# Patient Record
Sex: MALE | Race: BLACK OR AFRICAN AMERICAN | Employment: FULL TIME
[De-identification: names, ages, dates, MRNs, and addresses within clinical notes are randomized per-mention and may not be internally consistent; named-entity substitution may affect disease eponyms.]

---

## 2017-02-07 PROBLEM — E87.6 HYPOKALEMIA: Status: ACTIVE | Noted: 2017-02-07

## 2017-04-24 ENCOUNTER — HOSPITAL ENCOUNTER (OUTPATIENT)
Dept: GENERAL RADIOLOGY | Facility: CLINIC | Age: 52
Discharge: HOME | End: 2017-04-24
Payer: COMMERCIAL

## 2017-04-24 DIAGNOSIS — G89.29 CHRONIC PAIN OF BOTH KNEES: ICD-10-CM

## 2017-04-24 DIAGNOSIS — M25.562 CHRONIC PAIN OF BOTH KNEES: ICD-10-CM

## 2017-04-24 DIAGNOSIS — M25.561 CHRONIC PAIN OF BOTH KNEES: ICD-10-CM

## 2017-04-24 PROCEDURE — 73562 X-RAY EXAM OF KNEE 3: CPT

## 2017-06-19 ENCOUNTER — HOSPITAL ENCOUNTER (OUTPATIENT)
Age: 52
Setting detail: SPECIMEN
Discharge: HOME | End: 2017-06-19
Payer: COMMERCIAL

## 2017-06-19 LAB
ABSOLUTE EOS #: 0.2 K/UL (ref 0–0.4)
ABSOLUTE LYMPH #: 3.2 K/UL (ref 1–4.8)
ABSOLUTE MONO #: 0.6 K/UL (ref 0.1–1.2)
ALT SERPL-CCNC: 41 U/L (ref 5–41)
ANION GAP SERPL CALCULATED.3IONS-SCNC: 16 MMOL/L (ref 9–17)
AST SERPL-CCNC: 28 U/L
BASOPHILS # BLD: 1 %
BASOPHILS ABSOLUTE: 0 K/UL (ref 0–0.2)
BUN BLDV-MCNC: 25 MG/DL (ref 6–20)
BUN/CREAT BLD: ABNORMAL (ref 9–20)
CALCIUM SERPL-MCNC: 9.7 MG/DL (ref 8.6–10.4)
CHLORIDE BLD-SCNC: 107 MMOL/L (ref 98–107)
CO2: 19 MMOL/L (ref 20–31)
CREAT SERPL-MCNC: 1.94 MG/DL (ref 0.7–1.2)
DIFFERENTIAL TYPE: ABNORMAL
EOSINOPHILS RELATIVE PERCENT: 3 %
ESTIMATED AVERAGE GLUCOSE: 108 MG/DL
GFR AFRICAN AMERICAN: 44 ML/MIN
GFR NON-AFRICAN AMERICAN: 36 ML/MIN
GFR SERPL CREATININE-BSD FRML MDRD: ABNORMAL ML/MIN/{1.73_M2}
GFR SERPL CREATININE-BSD FRML MDRD: ABNORMAL ML/MIN/{1.73_M2}
GLUCOSE BLD-MCNC: 80 MG/DL (ref 70–99)
HBA1C MFR BLD: 5.4 % (ref 4–6)
HCT VFR BLD CALC: 36.8 % (ref 41–53)
HEMOGLOBIN: 12.2 G/DL (ref 13.5–17.5)
INSULIN COMMENT: NORMAL
INSULIN REFERENCE RANGE:: NORMAL
INSULIN: 67.7 MU/L
LYMPHOCYTES # BLD: 44 %
MCH RBC QN AUTO: 30.7 PG (ref 26–34)
MCHC RBC AUTO-ENTMCNC: 33.3 G/DL (ref 31–37)
MCV RBC AUTO: 92.3 FL (ref 80–100)
MONOCYTES # BLD: 9 %
PDW BLD-RTO: 14.8 % (ref 12.5–15.4)
PLATELET # BLD: 250 K/UL (ref 140–450)
PLATELET ESTIMATE: ABNORMAL
PMV BLD AUTO: 8.9 FL (ref 6–12)
POTASSIUM SERPL-SCNC: 4.5 MMOL/L (ref 3.7–5.3)
RBC # BLD: 3.98 M/UL (ref 4.5–5.9)
RBC # BLD: ABNORMAL 10*6/UL
SEG NEUTROPHILS: 43 %
SEGMENTED NEUTROPHILS ABSOLUTE COUNT: 3.1 K/UL (ref 1.8–7.7)
SEX HORMONE BINDING GLOBULIN: 31 NMOL/L (ref 11–80)
SODIUM BLD-SCNC: 142 MMOL/L (ref 135–144)
T3 FREE: 3.12 PG/ML (ref 2.02–4.43)
TESTOSTERONE FREE-NONMALE: 61 PG/ML (ref 47–244)
TESTOSTERONE TOTAL: 299 NG/DL (ref 220–1000)
THYROXINE, FREE: 0.87 NG/DL (ref 0.93–1.7)
TSH SERPL DL<=0.05 MIU/L-ACNC: 0.78 MIU/L (ref 0.3–5)
WBC # BLD: 7.2 K/UL (ref 3.5–11)
WBC # BLD: ABNORMAL 10*3/UL

## 2017-07-10 PROBLEM — S39.012A LUMBAR STRAIN: Status: ACTIVE | Noted: 2017-07-10

## 2017-07-18 PROBLEM — J44.9 COPD (CHRONIC OBSTRUCTIVE PULMONARY DISEASE) (HCC): Status: ACTIVE | Noted: 2017-07-18

## 2017-10-09 ENCOUNTER — OFFICE VISIT (OUTPATIENT)
Dept: PULMONOLOGY | Age: 52
End: 2017-10-09
Payer: COMMERCIAL

## 2017-10-09 VITALS
BODY MASS INDEX: 37.77 KG/M2 | SYSTOLIC BLOOD PRESSURE: 126 MMHG | DIASTOLIC BLOOD PRESSURE: 79 MMHG | HEART RATE: 80 BPM | TEMPERATURE: 98.8 F | OXYGEN SATURATION: 98 % | HEIGHT: 69 IN | WEIGHT: 255 LBS | RESPIRATION RATE: 14 BRPM

## 2017-10-09 DIAGNOSIS — J32.9 CHRONIC SINUSITIS, UNSPECIFIED LOCATION: ICD-10-CM

## 2017-10-09 DIAGNOSIS — J30.89 NON-SEASONAL ALLERGIC RHINITIS, UNSPECIFIED ALLERGIC RHINITIS TRIGGER: ICD-10-CM

## 2017-10-09 DIAGNOSIS — G47.33 OSA ON CPAP: ICD-10-CM

## 2017-10-09 DIAGNOSIS — Z99.89 OSA ON CPAP: ICD-10-CM

## 2017-10-09 DIAGNOSIS — R05.3 CHRONIC COUGH: Primary | ICD-10-CM

## 2017-10-09 PROCEDURE — 1036F TOBACCO NON-USER: CPT | Performed by: INTERNAL MEDICINE

## 2017-10-09 PROCEDURE — G8417 CALC BMI ABV UP PARAM F/U: HCPCS | Performed by: INTERNAL MEDICINE

## 2017-10-09 PROCEDURE — G8428 CUR MEDS NOT DOCUMENT: HCPCS | Performed by: INTERNAL MEDICINE

## 2017-10-09 PROCEDURE — 3017F COLORECTAL CA SCREEN DOC REV: CPT | Performed by: INTERNAL MEDICINE

## 2017-10-09 PROCEDURE — 99204 OFFICE O/P NEW MOD 45 MIN: CPT | Performed by: INTERNAL MEDICINE

## 2017-10-09 PROCEDURE — G8484 FLU IMMUNIZE NO ADMIN: HCPCS | Performed by: INTERNAL MEDICINE

## 2017-10-09 RX ORDER — FLUTICASONE PROPIONATE 50 MCG
2 SPRAY, SUSPENSION (ML) NASAL DAILY
Qty: 1 BOTTLE | Refills: 5 | Status: SHIPPED | OUTPATIENT
Start: 2017-10-09 | End: 2018-07-16 | Stop reason: ALTCHOICE

## 2017-10-09 RX ORDER — FLUTICASONE PROPIONATE 50 MCG
1 SPRAY, SUSPENSION (ML) NASAL DAILY
Qty: 1 BOTTLE | Refills: 3 | Status: SHIPPED | OUTPATIENT
Start: 2017-10-09 | End: 2017-10-09 | Stop reason: DRUGHIGH

## 2017-10-09 NOTE — PROGRESS NOTES
OUTPATIENT PULMONARY CONSULT NOTE      Patient:  Charli Dutta  MRN: [id number]    Consulting Physician: DR Charlotte Canchola  Reason for Consult: Chronic Cough  Primacy Care Physician: Oscar Beatty MD    HISTORY OF PRESENT ILLNESS:   The patient is a 45 y.o. male referred here for evaluation of cough  Positive cough for few years had cough before but worse for 2-3 years, cough is dry and also at night   No SOB, no wheezing, and chest tightness and able to do all activities and exercise and weight lifting and goes to gym  No orthopnea or PND  Enoc GERD symptoms, no heartburn and tried on PPI but did not help and stopped taking it    Per patient he had work up done and referred by Dr Charlotte Canchola to another doctor  including some kind of scope but does not remember where was it done , who did the scope and what kind of the doctor whether pulmonologist or gastroenterologist    Had PFTs done and showed ?  Obstruction and given symbicort 3 months ago and seems to help but he took for a month and stopped it  Denies use of ACEI and started on aldactone recently but cough before that and no new medicines  Has POLI on cpap and very compliant with cpap, cough also at night, followed by Dr Ron Roto    Does have allergy problems but worse for last 6 months ago and had sinus surgery/ cleaning done by done Dr Winda Galeazzi few years ago after which he felt better and now for last 6 months more problems again with nasal congestion, post nasal dripping    Denies occupational exposure, denies allergies or mold exposure, food or environmental allergies, denies smoking exposure, no animal exposure  Exsmoker for 1/2 ppd for 35 years and still smoke cigar off and on and it cause more cough and irritation     Past Medical History:        Diagnosis Date    Arthritis     Bronchitis     Caffeine use     low    Calculus of kidney     Cancer ([facility] [address]     Chronic kidney disease     Erectile dysfunction     GERD (gastroesophageal reflux disease)     tinnitus, ear drainage, earaches, snoring, sore mouth, sore throat, hoarseness and voice change  RESPIRATORY:  positive for  dry cough  negative for  cough with sputum, dyspnea, wheezing, hemoptysis, chest pain, pleuritic pain and cyanosis  CARDIOVASCULAR:  negative for  chest pain, dyspnea, palpitations, orthopnea, PND, exertional chest pressure/discomfort, fatigue, early saiety, edema, syncope  GASTROINTESTINAL:  negative for vomiting, change in bowel habits, diarrhea, constipation, abdominal pain, pruritus, abdominal mass, abdominal distention, jaundice, dysphagia, reflux, regurgitation, odynophagia, hematemesis and hemtochezia  GENITOURINARY:  negative for frequency, dysuria, nocturia, urinary incontinence, hesitancy, decreased stream and hematuria  HEMATOLOGIC/LYMPHATIC:  negative for easy bruising, bleeding, lymphadenopathy and petechiae  ALLERGIC/IMMUNOLOGIC:  negative for recurrent infections, urticaria, hay fever, angioedema and anaphylaxis  ENDOCRINE:  negative for heat intolerance, cold intolerance, tremor, weight changes and change in bowel habits  MUSCULOSKELETAL:  negative for  myalgias, arthralgias, pain, joint swelling, stiff joints, decreased range of motion and muscle weakness  NEUROLOGICAL:  negative for headaches, dizziness, seizures, memory problems, speech problems, visual disturbance, coordination problems, gait problems, tremor, dysphagia, weakness, numbness, syncope, near syncope and tingling  BEHAVIOR/PSYCH:  negative for poor appetite, increased appetite, decreased sleep, increased sleep, decreased energy level, increased energy level, poor concentration, depressed mood, elated mood, fatigue, agitated and increased agitation          Physical Exam:    Vitals: /79   Pulse 80   Temp 98.8 °F (37.1 °C)   Resp 14   Ht 5' 9\" (1.753 m)   Wt 255 lb (115.7 kg)   SpO2 98% Comment: ROOM AIR  BMI 37.66 kg/m²   Last 3 weights:    Wt Readings from Last 3 Encounters:   10/09/17 255 lb (115.7 kg)   09/11/17 253 lb (114.8 kg)   08/07/17 256 lb (116.1 kg)     Body mass index is 37.66 kg/m².     Physical Examination:   General appearance - alert, well appearing, and in no distress  Mental status - alert, oriented to person, place, and time  Eyes - pupils equal and reactive, extraocular eye movements intact, sclera anicteric  Ears - right ear normal, left ear normal  Nose - mucosal congestion, mucosal pallor and clear rhinorrhea  Mouth - mucous membranes moist, pharynx normal without lesions  Neck - supple, no significant adenopathy, short and thick  Chest - clear to auscultation, no wheezes, rales or rhonchi, symmetric air entry  Heart - normal rate, regular rhythm, normal S1, S2, no murmurs, rubs, clicks or gallops  Abdomen - soft, nontender, nondistended, no masses or organomegaly  Neurological - alert, oriented, normal speech, no focal findings or movement disorder noted}  Extremities - peripheral pulses normal, no pedal edema, no clubbing or cyanosis  Skin - normal coloration and turgor, no rashes, no suspicious skin lesions noted       LABS:    CBC:   WBC   Date Value Ref Range Status   06/19/2017 7.2 3.5 - 11.0 k/uL Final   01/03/2017 5.9 3.5 - 11.0 k/uL Final   02/26/2016 5.0 3.5 - 11.0 k/uL Final     Hemoglobin   Date Value Ref Range Status   06/19/2017 12.2 (L) 13.5 - 17.5 g/dL Final   01/03/2017 12.1 (L) 13.5 - 17.5 g/dL Final   02/26/2016 11.8 (L) 13.5 - 17.5 g/dL Final     Platelets   Date Value Ref Range Status   06/19/2017 250 140 - 450 k/uL Final   01/03/2017 261 140 - 450 k/uL Final   02/26/2016 213 140 - 450 k/uL Final     BMP:   Sodium   Date Value Ref Range Status   08/01/2017 136 mmol/L Final   06/19/2017 142 135 - 144 mmol/L Final   04/10/2017 142 mmol/L Final     Potassium   Date Value Ref Range Status   08/01/2017 4.2 mmol/L Final   06/19/2017 4.5 3.7 - 5.3 mmol/L Final   04/10/2017 3.1 mmol/L Final     Chloride   Date Value Ref Range Status   08/01/2017 105 mmol/L Final   06/19/2017 107 98 - 107 mmol/L Final   04/10/2017 104 mmol/L Final     CO2   Date Value Ref Range Status   08/01/2017 20 mmol/L Final   06/19/2017 19 (L) 20 - 31 mmol/L Final   04/10/2017 29 mmol/L Final     BUN   Date Value Ref Range Status   08/01/2017 29 mg/dL Final   06/19/2017 25 (H) 6 - 20 mg/dL Final   04/10/2017 17 mg/dL Final     CREATININE   Date Value Ref Range Status   08/01/2017 2.16  Final   06/19/2017 1.94 (H) 0.70 - 1.20 mg/dL Final   04/10/2017 1.60  Final     Glucose   Date Value Ref Range Status   08/01/2017 83 mg/dL Final   06/19/2017 80 70 - 99 mg/dL Final   04/10/2017 97 mg/dL Final     Hepatic:   AST   Date Value Ref Range Status   06/19/2017 28 <40 U/L Final     Comment:     [address], [address] [place] [address] [phone]   09/20/2016 23 <40 U/L Final     Comment:     [address], [address] [place] [address] [phone]   02/26/2016 25 <40 U/L Final     ALT   Date Value Ref Range Status   06/19/2017 41 5 - 41 U/L Final     Comment:     [address], [address] [place] [address] [phone]   09/20/2016 57 (H) 5 - 41 U/L Final     Comment:     [address], [address] [place] [address] [phone]   02/26/2016 32 5 - 41 U/L Final     Total Bilirubin   Date Value Ref Range Status   02/26/2016 0.50 0.3 - 1.2 mg/dL Final   12/10/2015 0.78 0.3 - 1.2 mg/dL Final   02/27/2015 0.56 0.3 - 1.2 mg/dL Final     Alkaline Phosphatase   Date Value Ref Range Status   02/26/2016 75 40 - 129 U/L Final   12/10/2015 70 40 - 129 U/L Final   02/27/2015 76 40 - 129 U/L Final     Amylase: No results found for: AMYLASE  Lipase: No results found for: LIPASE  CARDIAC ENZYMES: No results found for: CKTOTAL, CKMB, CKMBINDEX, TROPONINI  BNP: No results found for: BNP  Lipids:   Cholesterol, Total   Date Value Ref Range Status   10/03/2016 188 mg/dL Final     HDL   Date Value Ref Range Status   10/03/2016 35 35 - 70 mg/dL Final       INR: No results found for: INR  Thyroid:   TSH Date Value Ref Range Status   06/19/2017 0.78 0.30 - 5.00 mIU/L Final     Comment:     [employer] [address] [facility] [employer] [phone]     Urinalysis:   Bacteria, UA   Date Value Ref Range Status   01/03/2017 NOT REPORTED NONE Final     Blood, UA POC   Date Value Ref Range Status   04/24/2017 negative  Final     Clarity, UA   Date Value Ref Range Status   04/24/2017 clear  Final     Color, UA   Date Value Ref Range Status   04/24/2017 yellow  Final   01/03/2017 YELLOW YEL Final     pH, UA   Date Value Ref Range Status   01/03/2017 6.0 5.0 - 8.0 Final     Protein, UA   Date Value Ref Range Status   01/03/2017 2+ (A) NEG Final     Protein, UA POC   Date Value Ref Range Status   04/24/2017 100 mg  Final     RBC, UA   Date Value Ref Range Status   01/03/2017 0 TO 2 0 - 2 /HPF Final     Specific Gravity, UA   Date Value Ref Range Status   01/03/2017 1.018 1.005 - 1.030 Final     Bilirubin, UA   Date Value Ref Range Status   01/03/2017 neg  Final     Bilirubin Urine   Date Value Ref Range Status   01/03/2017 NEGATIVE NEG Final     Nitrite, Urine   Date Value Ref Range Status   01/03/2017 NEGATIVE NEG Final     WBC, UA   Date Value Ref Range Status   01/03/2017 0 TO 2 0 - 5 /HPF Final     Leukocyte Esterase, Urine   Date Value Ref Range Status   01/03/2017 NEGATIVE NEG Final     Comment:     [employer] [address] [employer] [phone]     Leukocytes, UA   Date Value Ref Range Status   04/24/2017 negative  Final     Glucose, Ur   Date Value Ref Range Status   01/03/2017 NEGATIVE NEG Final     Glucose, UA POC   Date Value Ref Range Status   04/24/2017 negative  Final     Cultures:-  -----------------------------------------------------------------    ABGs: No results found for: PHART, PO2ART, FQS3WMS    Pulmonary Functions Testing Results:    No results found for: FEV1, FVC, AOL9MZM, TLC, DLCO    CXR  8/28/17 report from [facility]  Impression:  Stable chest, no acute cardiopulmonary abnormalities seen. CT Scans    EKG:   ECHO:     Assessment and Plan       ICD-10-CM ICD-9-CM    1. Chronic cough R05 786.2    2. Non-seasonal allergic rhinitis, unspecified allergic rhinitis trigger J30.89 477.8    3. POLI on CPAP G47.33 327.23     Z99.89 V46.8         HTN (hypertension)    Prostate cancer (HCC)    Impotence of organic origin    Microscopic hematuria    Smoker    Hypokalemia    COPD (chronic obstructive pulmonary disease) ([facility])       Plan and Recommendations: Will need records from Dr Lanny Chandra office regarding scopes done as patient does not remember  CT Scan Sinuses  PFTs   Methacholine challenge test   Will need trial of ICS after above  Will need to start Flonase nasal spray   Nasal irrigation  Avoid allergens and triggers   Claritin as needed  Complete smoking cessation discussed  Reflux precautions    RTC 4 weeks      CPAP at least 4 hrs qhs  Wt loss is recommended and discussed  Follow good sleep hygeine instructions  Use humidifier if needed  Questions answered pertaining to diagnosis and management explained importance of compliance with therapy   He is followed with Dr Cruz Prajapati for sleep apnea      It was my pleasure to evaluate Carlton Winchester today. Please call with questions.     Malick Shine MD             10/9/2017, 1:43 PM

## 2017-10-11 ENCOUNTER — TELEPHONE (OUTPATIENT)
Dept: PULMONOLOGY | Age: 52
End: 2017-10-11

## 2017-10-16 ENCOUNTER — HOSPITAL ENCOUNTER (OUTPATIENT)
Dept: CT IMAGING | Age: 52
Discharge: HOME | End: 2017-10-16
Payer: COMMERCIAL

## 2017-10-16 DIAGNOSIS — R05.3 CHRONIC COUGH: ICD-10-CM

## 2017-10-16 DIAGNOSIS — J32.9 CHRONIC SINUSITIS, UNSPECIFIED LOCATION: ICD-10-CM

## 2017-10-16 PROCEDURE — 70486 CT MAXILLOFACIAL W/O DYE: CPT

## 2017-10-30 ENCOUNTER — OFFICE VISIT (OUTPATIENT)
Dept: PULMONOLOGY | Age: 52
End: 2017-10-30
Payer: COMMERCIAL

## 2017-10-30 VITALS
HEIGHT: 69 IN | OXYGEN SATURATION: 99 % | TEMPERATURE: 97.1 F | HEART RATE: 85 BPM | BODY MASS INDEX: 37.62 KG/M2 | DIASTOLIC BLOOD PRESSURE: 81 MMHG | RESPIRATION RATE: 14 BRPM | SYSTOLIC BLOOD PRESSURE: 122 MMHG | WEIGHT: 254 LBS

## 2017-10-30 VITALS — WEIGHT: 254 LBS | HEART RATE: 84 BPM | HEIGHT: 69 IN | BODY MASS INDEX: 37.62 KG/M2 | OXYGEN SATURATION: 99 %

## 2017-10-30 DIAGNOSIS — G47.33 OSA ON CPAP: ICD-10-CM

## 2017-10-30 DIAGNOSIS — J41.0 SMOKERS' COUGH (HCC): ICD-10-CM

## 2017-10-30 DIAGNOSIS — F17.210 SMOKING GREATER THAN 20 PACK YEARS: ICD-10-CM

## 2017-10-30 DIAGNOSIS — R05.3 CHRONIC COUGH: Primary | ICD-10-CM

## 2017-10-30 DIAGNOSIS — Z99.89 OSA ON CPAP: ICD-10-CM

## 2017-10-30 DIAGNOSIS — J32.9 CHRONIC SINUSITIS, UNSPECIFIED LOCATION: ICD-10-CM

## 2017-10-30 PROCEDURE — G8484 FLU IMMUNIZE NO ADMIN: HCPCS | Performed by: INTERNAL MEDICINE

## 2017-10-30 PROCEDURE — 1036F TOBACCO NON-USER: CPT | Performed by: INTERNAL MEDICINE

## 2017-10-30 PROCEDURE — 3023F SPIROM DOC REV: CPT | Performed by: INTERNAL MEDICINE

## 2017-10-30 PROCEDURE — 94726 PLETHYSMOGRAPHY LUNG VOLUMES: CPT | Performed by: INTERNAL MEDICINE

## 2017-10-30 PROCEDURE — 94729 DIFFUSING CAPACITY: CPT | Performed by: INTERNAL MEDICINE

## 2017-10-30 PROCEDURE — G8427 DOCREV CUR MEDS BY ELIG CLIN: HCPCS | Performed by: INTERNAL MEDICINE

## 2017-10-30 PROCEDURE — 94375 RESPIRATORY FLOW VOLUME LOOP: CPT | Performed by: INTERNAL MEDICINE

## 2017-10-30 PROCEDURE — 3017F COLORECTAL CA SCREEN DOC REV: CPT | Performed by: INTERNAL MEDICINE

## 2017-10-30 PROCEDURE — 99213 OFFICE O/P EST LOW 20 MIN: CPT | Performed by: INTERNAL MEDICINE

## 2017-10-30 PROCEDURE — G8417 CALC BMI ABV UP PARAM F/U: HCPCS | Performed by: INTERNAL MEDICINE

## 2017-10-30 PROCEDURE — G8926 SPIRO NO PERF OR DOC: HCPCS | Performed by: INTERNAL MEDICINE

## 2017-10-30 ASSESSMENT — ENCOUNTER SYMPTOMS
EYES NEGATIVE: 1
COUGH: 1

## 2017-10-30 NOTE — PROGRESS NOTES
Subjective:      Patient ID: David Frazier is a 45 y.o. male. HPI  Follow-up visit for chronic cough. Last seen by Dr. Gurmeet Vazquez about a month ago. Reports severe, unremitting cough. Minimal sputum production. No hemoptysis. Had pulmonary function studies done today which show normal flows lung volumes and diffusing capacity. Flow volume loop normal as well. FVC 4.36 L (110%) FEV1 3.16 L (100%), FEV1/FVC 72%. Chest x-ray report from the [facility] dated August 28 was read as no acute cardiopulmonary disease. The patient continues to smoke about a half pack of cigarettes per day. He states that all attempts at quitting a been unsuccessful. He states that he quit for about a month. Noted significant decrease in his symptoms of cough. He restarted for unclear reasons. States that he's used Chantix, nicotine replacement therapy, and cold turkey all without lasting result. \"I don't know why I smoke. \"  Patient has a history of chronic sinusitis post submucous resection. Repeat CT scan of the sinuses is reviewed. Minimal mucosal thickening in both maxillary sinuses otherwise clear. Patient specifically denies symptoms of gastroesophageal reflux. Review of Systems   Constitutional: Negative. HENT: Negative. Eyes: Negative. Respiratory: Positive for cough. Cardiovascular: Negative. All other systems reviewed and are negative. Objective:     Physical Exam   Constitutional: He is oriented to person, place, and time. He appears well-developed and well-nourished. Coughing was noted throughout the entire interview. HENT:   Large tongue , low hanging soft palate and large uvula. Overall pharyngeal orifice moderately decreased     Eyes: Conjunctivae are normal. No scleral icterus. Neck: Neck supple. No JVD present. No tracheal deviation present. No thyromegaly present. Cardiovascular: Normal rate, regular rhythm and normal heart sounds. Exam reveals no gallop.     No murmur heard.  Pulmonary/Chest: Effort normal. No respiratory distress. He has no wheezes. He has no rales. He exhibits no tenderness. Abdominal: Soft. There is no tenderness. Musculoskeletal: He exhibits no edema. Lymphadenopathy:     He has no cervical adenopathy. Neurological: He is alert and oriented to person, place, and time. Skin: Skin is warm and dry. Nursing note and vitals reviewed. Wt Readings from Last 3 Encounters:   10/30/17 254 lb (115.2 kg)   10/30/17 254 lb (115.2 kg)   10/09/17 255 lb (115.7 kg)       Results for orders placed or performed in visit on 37/01/88   Basic Metabolic Panel   Result Value Ref Range    Sodium 136 mmol/L    Chloride 105 mmol/L    Potassium 4.2 mmol/L    BUN 29 mg/dL    CREATININE 2.16     Glucose 83 mg/dL    CO2 20 mmol/L    Calcium 9.6 mg/dL    Gfr Calculated         Assessment:      1. Chronic cough    2. Smokers' cough ([address]    3. Smoking greater than 20 pack years    4. Chronic sinusitis, unspecified location    5. POLI on CPAP          Plan:      1. Strongly admonished him to quit smoking. Undoubtedly, the cause of his cough is cigarette smoking. I spent considerable time discussing this issue with him. Offered strategies for quitting. 2. From my standpoint bronchodilator use only if effective. Patient states that his cough is somewhat better on Symbicort. No evidence for COPD on spirometry. 3. Encouraged yearly flu shot. 4. Continue CPAP for sleep apnea. 5. Follow-up with Dr. Maribel Sykes for ongoing care. Return if new or advancing symptoms. Please call with questions or problems.       Electronically signed by Debi Owusu DO on 10/30/2017 at 6:00 PM

## 2018-01-29 ENCOUNTER — HOSPITAL ENCOUNTER (OUTPATIENT)
Age: 53
Setting detail: SPECIMEN
Discharge: HOME | End: 2018-01-29
Payer: COMMERCIAL

## 2018-01-30 LAB
CULTURE: NO GROWTH
CULTURE: NORMAL
Lab: NORMAL
SPECIMEN DESCRIPTION: NORMAL
STATUS: NORMAL

## 2018-02-12 ENCOUNTER — HOSPITAL ENCOUNTER (OUTPATIENT)
Dept: ULTRASOUND IMAGING | Age: 53
Discharge: HOME | End: 2018-02-14
Payer: COMMERCIAL

## 2018-02-12 DIAGNOSIS — N28.1 RENAL CYST, RIGHT: ICD-10-CM

## 2018-02-12 PROCEDURE — 76775 US EXAM ABDO BACK WALL LIM: CPT

## 2018-02-16 PROBLEM — N28.1 RENAL CYST, LEFT: Status: ACTIVE | Noted: 2018-02-16

## 2018-07-16 ENCOUNTER — APPOINTMENT (OUTPATIENT)
Dept: ULTRASOUND IMAGING | Age: 53
End: 2018-07-16
Payer: COMMERCIAL

## 2018-07-16 ENCOUNTER — HOSPITAL ENCOUNTER (EMERGENCY)
Age: 53
Discharge: HOME | End: 2018-07-16
Attending: EMERGENCY MEDICINE
Payer: COMMERCIAL

## 2018-07-16 VITALS
WEIGHT: 245.5 LBS | RESPIRATION RATE: 14 BRPM | BODY MASS INDEX: 36.36 KG/M2 | DIASTOLIC BLOOD PRESSURE: 102 MMHG | HEART RATE: 88 BPM | HEIGHT: 69 IN | OXYGEN SATURATION: 99 % | TEMPERATURE: 98.4 F | SYSTOLIC BLOOD PRESSURE: 150 MMHG

## 2018-07-16 DIAGNOSIS — R03.0 ELEVATED BLOOD PRESSURE READING: ICD-10-CM

## 2018-07-16 DIAGNOSIS — N45.1 EPIDIDYMITIS, LEFT: Primary | ICD-10-CM

## 2018-07-16 LAB
-: NORMAL
AMORPHOUS: NORMAL
BACTERIA: NORMAL
BILIRUBIN URINE: NEGATIVE
BILIRUBIN, POC: NEGATIVE
BLOOD URINE, POC: NORMAL
CASTS UA: NORMAL /LPF
CHP ED QC CHECK: NORMAL
CLARITY, POC: CLEAR
COLOR, POC: YELLOW
COLOR: YELLOW
COMMENT UA: ABNORMAL
CRYSTALS, UA: NORMAL /HPF
EPITHELIAL CELLS UA: NORMAL /HPF (ref 0–5)
GLUCOSE URINE, POC: NEGATIVE
GLUCOSE URINE: NEGATIVE
KETONES, POC: NEGATIVE
KETONES, URINE: NEGATIVE
LEUKOCYTE EST, POC: NEGATIVE
LEUKOCYTE ESTERASE, URINE: NEGATIVE
MUCUS: NORMAL
NITRITE, POC: NEGATIVE
NITRITE, URINE: NEGATIVE
OTHER OBSERVATIONS UA: NORMAL
PH UA: 6 (ref 5–8)
PH, POC: 5.5
PROTEIN UA: ABNORMAL
PROTEIN, POC: NORMAL
RBC UA: NORMAL /HPF (ref 0–2)
RENAL EPITHELIAL, UA: NORMAL /HPF
SPECIFIC GRAVITY UA: 1.02 (ref 1–1.03)
SPECIFIC GRAVITY, POC: 1.02
TRICHOMONAS: NORMAL
TURBIDITY: ABNORMAL
URINE HGB: ABNORMAL
UROBILINOGEN, POC: NORMAL
UROBILINOGEN, URINE: NORMAL
WBC UA: NORMAL /HPF (ref 0–5)
YEAST: NORMAL

## 2018-07-16 PROCEDURE — 93976 VASCULAR STUDY: CPT

## 2018-07-16 PROCEDURE — 99284 EMERGENCY DEPT VISIT MOD MDM: CPT

## 2018-07-16 PROCEDURE — 87491 CHLMYD TRACH DNA AMP PROBE: CPT

## 2018-07-16 PROCEDURE — 81001 URINALYSIS AUTO W/SCOPE: CPT

## 2018-07-16 PROCEDURE — 87591 N.GONORRHOEAE DNA AMP PROB: CPT

## 2018-07-16 PROCEDURE — 76870 US EXAM SCROTUM: CPT

## 2018-07-16 RX ORDER — LEVOFLOXACIN 500 MG/1
500 TABLET, FILM COATED ORAL DAILY
Qty: 10 TABLET | Refills: 0 | Status: SHIPPED | OUTPATIENT
Start: 2018-07-16 | End: 2018-07-26

## 2018-07-16 RX ORDER — HYDROCODONE BITARTRATE AND ACETAMINOPHEN 5; 325 MG/1; MG/1
1 TABLET ORAL EVERY 8 HOURS PRN
Qty: 20 TABLET | Refills: 0 | Status: SHIPPED | OUTPATIENT
Start: 2018-07-16 | End: 2018-07-21

## 2018-07-16 RX ORDER — IBUPROFEN 800 MG/1
800 TABLET ORAL EVERY 8 HOURS PRN
Qty: 30 TABLET | Refills: 0 | Status: SHIPPED | OUTPATIENT
Start: 2018-07-16 | End: 2019-01-23

## 2018-07-16 ASSESSMENT — PAIN DESCRIPTION - ORIENTATION: ORIENTATION: LEFT

## 2018-07-16 ASSESSMENT — PAIN DESCRIPTION - PAIN TYPE: TYPE: ACUTE PAIN

## 2018-07-16 ASSESSMENT — PAIN SCALES - GENERAL: PAINLEVEL_OUTOF10: 8

## 2018-07-16 ASSESSMENT — PAIN DESCRIPTION - LOCATION: LOCATION: GROIN

## 2018-07-16 NOTE — ED PROVIDER NOTES
SURGICAL HISTORY           Procedure Laterality Date    COLONOSCOPY      PRE-MALIGNANT / BENIGN SKIN LESION EXCISION      benign lesion from lip area removed    PROSTATECTOMY  Robotic 04/08/14    SINUS SURGERY  2014         FAMILY HISTORY       Family History   Problem Relation Age of Onset    High Blood Pressure Mother     High Blood Pressure Father      Family Status   Relation Status    Mother (Not Specified)    Father (Not Specified)        SOCIAL HISTORY      reports that he quit smoking about 2 years ago. He smoked 0.50 packs per day. He has never used smokeless tobacco. He reports that he does not drink alcohol or use drugs. REVIEW OF SYSTEMS    (2-9 systems for level 4, 10 or more for level 5)   Review of Systems    Except as noted above the remainder of the review of systems was reviewed and negative. PHYSICAL EXAM    (up to 7 for level 4, 8 or more for level 5)     ED Triage Vitals [07/16/18 0943]   BP Temp Temp Source Pulse Resp SpO2 Height Weight   (!) 150/102 98.4 °F (36.9 °C) Oral 88 14 99 % 5' 9\" (1.753 m) 245 lb 8 oz (111.4 kg)     Physical Exam   Constitutional: He is oriented to person, place, and time. He appears well-developed. HENT:   Head: Normocephalic and atraumatic. Eyes: EOM are normal.   Neck: Normal range of motion. Neck supple. Cardiovascular: Normal rate and regular rhythm. Pulmonary/Chest: Effort normal and breath sounds normal.   Abdominal: Soft. Genitourinary: Left testis shows swelling and tenderness. Musculoskeletal: Normal range of motion. Neurological: He is alert and oriented to person, place, and time. Skin: Skin is warm. Psychiatric: He has a normal mood and affect.  His behavior is normal.               DIAGNOSTIC RESULTS     EKG: All EKG's are interpreted by the Emergency Department Physician who either signs or Co-signs this chart in the absence of a cardiologist.        RADIOLOGY:   Non-plain film images such as CT, Ultrasound and

## 2018-07-16 NOTE — ED NOTES
Patient education flyer \"[facility] What [facility] to [facility]\" was provided and reviewed. Questions answered and understanding was verbalized by the patient and/or family.      Ashlie García RN  07/16/18 0319

## 2018-07-17 LAB
C. TRACHOMATIS DNA ,URINE: NEGATIVE
N. GONORRHOEAE DNA, URINE: NEGATIVE

## 2019-01-23 ENCOUNTER — HOSPITAL ENCOUNTER (EMERGENCY)
Age: 54
Discharge: HOME | End: 2019-01-23
Attending: EMERGENCY MEDICINE
Payer: COMMERCIAL

## 2019-01-23 VITALS
OXYGEN SATURATION: 97 % | RESPIRATION RATE: 24 BRPM | SYSTOLIC BLOOD PRESSURE: 177 MMHG | BODY MASS INDEX: 37.37 KG/M2 | HEART RATE: 104 BPM | WEIGHT: 252.3 LBS | TEMPERATURE: 98.3 F | DIASTOLIC BLOOD PRESSURE: 122 MMHG | HEIGHT: 69 IN

## 2019-01-23 DIAGNOSIS — K60.2 RECTAL FISSURE: Primary | ICD-10-CM

## 2019-01-23 DIAGNOSIS — K64.4 EXTERNAL HEMORRHOID: ICD-10-CM

## 2019-01-23 DIAGNOSIS — I10 ESSENTIAL HYPERTENSION: ICD-10-CM

## 2019-01-23 PROCEDURE — 6370000000 HC RX 637 (ALT 250 FOR IP): Performed by: EMERGENCY MEDICINE

## 2019-01-23 PROCEDURE — 99282 EMERGENCY DEPT VISIT SF MDM: CPT

## 2019-01-23 RX ORDER — CLONIDINE HYDROCHLORIDE 0.1 MG/1
0.1 TABLET ORAL ONCE
Status: COMPLETED | OUTPATIENT
Start: 2019-01-23 | End: 2019-01-23

## 2019-01-23 RX ORDER — SENNA AND DOCUSATE SODIUM 50; 8.6 MG/1; MG/1
2 TABLET, FILM COATED ORAL 2 TIMES DAILY PRN
Qty: 20 TABLET | Refills: 0 | Status: SHIPPED | OUTPATIENT
Start: 2019-01-23 | End: 2019-09-23

## 2019-01-23 RX ORDER — DIBUCAINE 0.28 G/28G
OINTMENT TOPICAL ONCE
Status: COMPLETED | OUTPATIENT
Start: 2019-01-23 | End: 2019-01-23

## 2019-01-23 RX ORDER — OXYCODONE HYDROCHLORIDE AND ACETAMINOPHEN 5; 325 MG/1; MG/1
1-2 TABLET ORAL EVERY 6 HOURS PRN
Qty: 20 TABLET | Refills: 0 | Status: SHIPPED | OUTPATIENT
Start: 2019-01-23 | End: 2019-01-28

## 2019-01-23 RX ADMIN — DIBUCAINE: 0.28 OINTMENT TOPICAL at 01:09

## 2019-01-23 RX ADMIN — CLONIDINE HYDROCHLORIDE 0.1 MG: 0.1 TABLET ORAL at 01:09

## 2019-01-23 ASSESSMENT — PAIN DESCRIPTION - ONSET: ONSET: ON-GOING

## 2019-01-23 ASSESSMENT — PAIN DESCRIPTION - LOCATION: LOCATION: RECTUM

## 2019-01-23 ASSESSMENT — PAIN SCALES - GENERAL: PAINLEVEL_OUTOF10: 9

## 2019-01-23 ASSESSMENT — PAIN DESCRIPTION - PAIN TYPE: TYPE: ACUTE PAIN

## 2019-01-23 ASSESSMENT — PAIN DESCRIPTION - PROGRESSION: CLINICAL_PROGRESSION: NOT CHANGED

## 2019-01-23 ASSESSMENT — PAIN DESCRIPTION - DESCRIPTORS: DESCRIPTORS: ITCHING;BURNING

## 2019-01-23 ASSESSMENT — PAIN DESCRIPTION - FREQUENCY: FREQUENCY: CONTINUOUS

## 2019-03-04 ENCOUNTER — HOSPITAL ENCOUNTER (OUTPATIENT)
Age: 54
Setting detail: SPECIMEN
Discharge: HOME | End: 2019-03-04
Payer: COMMERCIAL

## 2019-03-07 LAB — SURGICAL PATHOLOGY REPORT: NORMAL

## 2019-12-09 PROBLEM — R39.15 URGENCY OF URINATION: Status: ACTIVE | Noted: 2019-12-09

## 2019-12-23 ENCOUNTER — HOSPITAL ENCOUNTER (OUTPATIENT)
Age: 54
Setting detail: SPECIMEN
Discharge: HOME | End: 2019-12-23
Payer: COMMERCIAL

## 2020-01-02 LAB — SURGICAL PATHOLOGY REPORT: NORMAL

## 2020-08-17 ENCOUNTER — HOSPITAL ENCOUNTER (EMERGENCY)
Age: 55
Discharge: HOME | End: 2020-08-17
Attending: EMERGENCY MEDICINE
Payer: COMMERCIAL

## 2020-08-17 ENCOUNTER — APPOINTMENT (OUTPATIENT)
Dept: CT IMAGING | Age: 55
End: 2020-08-17
Payer: COMMERCIAL

## 2020-08-17 VITALS
WEIGHT: 242.1 LBS | TEMPERATURE: 98.9 F | SYSTOLIC BLOOD PRESSURE: 135 MMHG | HEART RATE: 84 BPM | RESPIRATION RATE: 16 BRPM | BODY MASS INDEX: 35.86 KG/M2 | DIASTOLIC BLOOD PRESSURE: 85 MMHG | OXYGEN SATURATION: 98 % | HEIGHT: 69 IN

## 2020-08-17 LAB
ABSOLUTE EOS #: 0.2 K/UL (ref 0–0.44)
ABSOLUTE IMMATURE GRANULOCYTE: 0.03 K/UL (ref 0–0.3)
ABSOLUTE LYMPH #: 2.26 K/UL (ref 1.1–3.7)
ABSOLUTE MONO #: 0.91 K/UL (ref 0.1–1.2)
ALBUMIN SERPL-MCNC: 4.2 G/DL (ref 3.5–5.2)
ALBUMIN/GLOBULIN RATIO: ABNORMAL (ref 1–2.5)
ALP BLD-CCNC: 83 U/L (ref 40–129)
ALT SERPL-CCNC: 32 U/L (ref 5–41)
ANION GAP SERPL CALCULATED.3IONS-SCNC: 12 MMOL/L (ref 9–17)
AST SERPL-CCNC: 21 U/L
BASOPHILS # BLD: 0 % (ref 0–2)
BASOPHILS ABSOLUTE: 0.03 K/UL (ref 0–0.2)
BILIRUB SERPL-MCNC: 0.3 MG/DL (ref 0.3–1.2)
BUN BLDV-MCNC: 30 MG/DL (ref 6–20)
BUN/CREAT BLD: 15 (ref 9–20)
CALCIUM SERPL-MCNC: 12 MG/DL (ref 8.6–10.4)
CHLORIDE BLD-SCNC: 101 MMOL/L (ref 98–107)
CO2: 25 MMOL/L (ref 20–31)
CREAT SERPL-MCNC: 1.98 MG/DL (ref 0.7–1.2)
DIFFERENTIAL TYPE: ABNORMAL
EOSINOPHILS RELATIVE PERCENT: 2 % (ref 1–4)
GFR AFRICAN AMERICAN: 42 ML/MIN
GFR NON-AFRICAN AMERICAN: 35 ML/MIN
GFR SERPL CREATININE-BSD FRML MDRD: ABNORMAL ML/MIN/{1.73_M2}
GFR SERPL CREATININE-BSD FRML MDRD: ABNORMAL ML/MIN/{1.73_M2}
GLUCOSE BLD-MCNC: 103 MG/DL (ref 70–99)
HCT VFR BLD CALC: 30.8 % (ref 40.7–50.3)
HEMOGLOBIN: 10.1 G/DL (ref 13–17)
IMMATURE GRANULOCYTES: 0 %
LIPASE: 29 U/L (ref 13–60)
LYMPHOCYTES # BLD: 28 % (ref 24–43)
MCH RBC QN AUTO: 31.9 PG (ref 25.2–33.5)
MCHC RBC AUTO-ENTMCNC: 32.8 G/DL (ref 28.4–34.8)
MCV RBC AUTO: 97.2 FL (ref 82.6–102.9)
MONOCYTES # BLD: 11 % (ref 3–12)
NRBC AUTOMATED: 0 PER 100 WBC
PDW BLD-RTO: 12 % (ref 11.8–14.4)
PLATELET # BLD: 217 K/UL (ref 138–453)
PLATELET ESTIMATE: ABNORMAL
PMV BLD AUTO: 9.5 FL (ref 8.1–13.5)
POTASSIUM SERPL-SCNC: 4 MMOL/L (ref 3.7–5.3)
PTH INTACT: 1.82 PG/ML (ref 15–65)
RBC # BLD: 3.17 M/UL (ref 4.21–5.77)
RBC # BLD: ABNORMAL 10*6/UL
SEG NEUTROPHILS: 59 % (ref 36–65)
SEGMENTED NEUTROPHILS ABSOLUTE COUNT: 4.76 K/UL (ref 1.5–8.1)
SODIUM BLD-SCNC: 138 MMOL/L (ref 135–144)
TOTAL PROTEIN: 7.6 G/DL (ref 6.4–8.3)
TROPONIN INTERP: NORMAL
TROPONIN T: NORMAL NG/ML
TROPONIN, HIGH SENSITIVITY: 15 NG/L (ref 0–22)
VITAMIN D 25-HYDROXY: 33.8 NG/ML (ref 30–100)
WBC # BLD: 8.2 K/UL (ref 3.5–11.3)
WBC # BLD: ABNORMAL 10*3/UL

## 2020-08-17 PROCEDURE — 74176 CT ABD & PELVIS W/O CONTRAST: CPT

## 2020-08-17 PROCEDURE — 82306 VITAMIN D 25 HYDROXY: CPT

## 2020-08-17 PROCEDURE — 2580000003 HC RX 258: Performed by: EMERGENCY MEDICINE

## 2020-08-17 PROCEDURE — 99284 EMERGENCY DEPT VISIT MOD MDM: CPT

## 2020-08-17 PROCEDURE — 2500000003 HC RX 250 WO HCPCS: Performed by: EMERGENCY MEDICINE

## 2020-08-17 PROCEDURE — 96375 TX/PRO/DX INJ NEW DRUG ADDON: CPT

## 2020-08-17 PROCEDURE — 85025 COMPLETE CBC W/AUTO DIFF WBC: CPT

## 2020-08-17 PROCEDURE — 84484 ASSAY OF TROPONIN QUANT: CPT

## 2020-08-17 PROCEDURE — 6360000002 HC RX W HCPCS: Performed by: EMERGENCY MEDICINE

## 2020-08-17 PROCEDURE — 83690 ASSAY OF LIPASE: CPT

## 2020-08-17 PROCEDURE — 83970 ASSAY OF PARATHORMONE: CPT

## 2020-08-17 PROCEDURE — 96374 THER/PROPH/DIAG INJ IV PUSH: CPT

## 2020-08-17 PROCEDURE — 80053 COMPREHEN METABOLIC PANEL: CPT

## 2020-08-17 RX ORDER — FAMOTIDINE 40 MG/1
20 TABLET, FILM COATED ORAL DAILY
Qty: 30 TABLET | Refills: 0 | Status: SHIPPED | OUTPATIENT
Start: 2020-08-17 | End: 2020-08-17 | Stop reason: SDUPTHER

## 2020-08-17 RX ORDER — FAMOTIDINE 40 MG/1
20 TABLET, FILM COATED ORAL DAILY
Qty: 30 TABLET | Refills: 0 | Status: SHIPPED | OUTPATIENT
Start: 2020-08-17 | End: 2020-10-12

## 2020-08-17 RX ORDER — MORPHINE SULFATE 4 MG/ML
4 INJECTION, SOLUTION INTRAMUSCULAR; INTRAVENOUS ONCE
Status: COMPLETED | OUTPATIENT
Start: 2020-08-17 | End: 2020-08-17

## 2020-08-17 RX ORDER — 0.9 % SODIUM CHLORIDE 0.9 %
80 INTRAVENOUS SOLUTION INTRAVENOUS ONCE
Status: DISCONTINUED | OUTPATIENT
Start: 2020-08-17 | End: 2020-08-17 | Stop reason: HOSPADM

## 2020-08-17 RX ORDER — SODIUM CHLORIDE 0.9 % (FLUSH) 0.9 %
10 SYRINGE (ML) INJECTION PRN
Status: DISCONTINUED | OUTPATIENT
Start: 2020-08-17 | End: 2020-08-17 | Stop reason: HOSPADM

## 2020-08-17 RX ORDER — 0.9 % SODIUM CHLORIDE 0.9 %
1000 INTRAVENOUS SOLUTION INTRAVENOUS ONCE
Status: COMPLETED | OUTPATIENT
Start: 2020-08-17 | End: 2020-08-17

## 2020-08-17 RX ORDER — DICYCLOMINE HYDROCHLORIDE 10 MG/1
10 CAPSULE ORAL
Qty: 120 CAPSULE | Refills: 3 | Status: SHIPPED | OUTPATIENT
Start: 2020-08-17 | End: 2020-10-12

## 2020-08-17 RX ORDER — ONDANSETRON 2 MG/ML
4 INJECTION INTRAMUSCULAR; INTRAVENOUS ONCE
Status: COMPLETED | OUTPATIENT
Start: 2020-08-17 | End: 2020-08-17

## 2020-08-17 RX ORDER — DICYCLOMINE HYDROCHLORIDE 10 MG/1
10 CAPSULE ORAL
Qty: 120 CAPSULE | Refills: 3 | Status: SHIPPED | OUTPATIENT
Start: 2020-08-17 | End: 2020-08-17 | Stop reason: SDUPTHER

## 2020-08-17 RX ADMIN — SODIUM CHLORIDE 1000 ML: 9 INJECTION, SOLUTION INTRAVENOUS at 04:55

## 2020-08-17 RX ADMIN — FAMOTIDINE 20 MG: 10 INJECTION, SOLUTION INTRAVENOUS at 05:05

## 2020-08-17 RX ADMIN — MORPHINE SULFATE 4 MG: 4 INJECTION, SOLUTION INTRAMUSCULAR; INTRAVENOUS at 04:55

## 2020-08-17 RX ADMIN — ONDANSETRON 4 MG: 2 INJECTION INTRAMUSCULAR; INTRAVENOUS at 05:05

## 2020-08-17 ASSESSMENT — PAIN SCALES - GENERAL: PAINLEVEL_OUTOF10: 7

## 2020-08-17 NOTE — ED PROVIDER NOTES
EMERGENCY DEPARTMENT ENCOUNTER    Pt Name: Antony Cali  MRN: [id number]  [place] 1965  Date of evaluation: 8/17/20  CHIEF COMPLAINT       Chief Complaint   Patient presents with    Abdominal Pain     pain for a week;     Emesis     last emesis was an hour ago     HISTORY OF PRESENT ILLNESS   Patient is a 69-year-old male with PMH of prostate cancer, GERD, CKD who presents to the ED complaining of epigastric pain. Pain started 1 week ago. Pain does not radiate. Pain described as burning, constant, rated 6/10. Pain worse with p.o. intake. Pain improves with Tums. Also reports nausea and vomiting. No diarrhea. No fevers, chest pain, changes in urine. REVIEW OF SYSTEMS     Review of Systems   All other systems reviewed and are negative. PASTMEDICAL HISTORY     Past Medical History:   Diagnosis Date    Arthritis     Bronchitis     Caffeine use     low    Calculus of kidney     Cancer (HCC)     Chronic kidney disease     Epididymitis     Erectile dysfunction     GERD (gastroesophageal reflux disease)     Hypertension     Hypokalemia 2/7/2017    Microhematuria     Personal history of malignant neoplasm of prostate     Pseudofolliculitis barbae 6/6/4707    Sinusitis     Sleep apnea     Unspecified sleep apnea     Urinary frequency      SURGICAL HISTORY       Past Surgical History:   Procedure Laterality Date    COLONOSCOPY      PRE-MALIGNANT / BENIGN SKIN LESION EXCISION      benign lesion from lip area removed    PROSTATECTOMY  Robotic 04/08/14    SINUS SURGERY  2014     CURRENT MEDICATIONS       Previous Medications    AMLODIPINE (NORVASC) 10 MG TABLET    TAKE 1 TABLET BY MOUTH EVERY DAY    DIBUCAINE 1 % OINT RECTAL OINTMENT    Place rectally 4 times daily as needed for Pain    HYDROCORTISONE (ANUSOL-HC) 2.5 % RECTAL CREAM    Place rectally 2 times daily.     LEVOCETIRIZINE (XYZAL) 5 MG TABLET    TAKE 1 TABLET BY MOUTH EVERY DAY    OLOPATADINE (PATADAY) 0.2 % SOLN OPHTHALMIC SOLUTION        SILDENAFIL (VIAGRA) 100 MG TABLET    Take 1 tablet by mouth as needed for Erectile Dysfunction    SPIRONOLACTONE (ALDACTONE) 25 MG TABLET    TAKE 1 TABLET BY MOUTH THREE TIMES A DAY    TADALAFIL (CIALIS) 20 MG TABLET    Take 1 tablet by mouth daily as needed for Erectile Dysfunction    TOBRAMYCIN (TOBREX) 0.3 % OPHTHALMIC SOLUTION    ADMINISTER 1 DROP TO THE RIGHT EYE EVERY 4 HOURS WHILE AWAKE FOR 6 DAYS. ALLERGIES     has No Known Allergies. FAMILY HISTORY     He indicated that the status of his mother is unknown. He indicated that the status of his father is unknown. SOCIAL HISTORY       Social History     Tobacco Use    Smoking status: Former Smoker     Packs/day: 0.50     Last attempt to quit: 2016     Years since quittin.4    Smokeless tobacco: Never Used    Tobacco comment: Smoked 2 cigars / day  in past smoked 1/2 pack cigs / day for 20 years - quit cigs    Substance Use Topics    Alcohol use: No     Alcohol/week: 0.0 standard drinks    Drug use: No     PHYSICAL EXAM     INITIAL VITALS: /85   Pulse 84   Temp 98.9 °F (37.2 °C) (Oral)   Resp 16   Ht 5' 9\" (1.753 m)   Wt 242 lb 1.6 oz (109.8 kg)   SpO2 98%   BMI 35.75 kg/m²    Physical Exam  HENT:      Head: Normocephalic. Right Ear: External ear normal.      Left Ear: External ear normal.      Nose: Nose normal.   Eyes:      Conjunctiva/sclera: Conjunctivae normal.   Cardiovascular:      Rate and Rhythm: Normal rate. Pulmonary:      Effort: Pulmonary effort is normal.   Abdominal:      General: Abdomen is flat. Tenderness: There is abdominal tenderness. There is no guarding or rebound. Comments: Mild epigastric tenderness. Skin:     General: Skin is dry. Neurological:      Mental Status: He is alert. Mental status is at baseline.    Psychiatric:         Mood and Affect: Mood normal.         Behavior: Behavior normal.         MEDICAL DECISION MAKING:   The patient is hemodynamically stable, afebrile, nontoxic-appearing. Physical exam notable for mild epigastric tenderness. Based on history and exam friends includes PUD, pancreatitis,  GERD, infectious process. ED plan for basic labs, fluids, CT and pelvis, reassess. CRITICAL CARE:       PROCEDURES:    Procedures    DIAGNOSTIC RESULTS   EKG:All EKG's are interpreted by the Emergency Department Physician who either signs or Co-signs this chart in the absence of a cardiologist.        RADIOLOGY:All plain film, CT, MRI, and formal ultrasound images (except ED bedside ultrasound) are read by the radiologist, see reports below, unless otherwisenoted in MDM or here. CT ABDOMEN PELVIS WO CONTRAST Additional Contrast? None   Preliminary Result   1. Focal thinning of the gastric mucosa in the dorsal mid gastric body with   trace surrounding inflammatory change. Findings suggest the presence of a   gastric ulcer. No overt CT evidence of perforation. 2. Coronary artery disease. 3. Indeterminate 1.5 cm right adrenal nodule likely reflects a benign adrenal   adenoma. Suggest 1 year follow-up adrenal protocol CT or chemical shift MRI   to ensure stability. 4. Diverticulosis. LABS: All lab results were reviewed by myself, and all abnormals are listed below. Labs Reviewed   CBC WITH AUTO DIFFERENTIAL - Abnormal; Notable for the following components:       Result Value    RBC 3.17 (*)     Hemoglobin 10.1 (*)     Hematocrit 30.8 (*)     All other components within normal limits   COMPREHENSIVE METABOLIC PANEL W/ REFLEX TO MG FOR LOW K - Abnormal; Notable for the following components:    Glucose 103 (*)     BUN 30 (*)     CREATININE 1.98 (*)     Calcium 12.0 (*)     GFR Non- 35 (*)     GFR  42 (*)     All other components within normal limits   LIPASE   TROPONIN   PTH, INTACT   VITAMIN D 25 HYDROXY       EMERGENCY DEPARTMENTCOURSE:   Patient did well in the ED. Labs remarkable for calcium 12.   I added on PTH and Vit D 25 Hydroxy which he will follow-up with his primary care physician. CT scan shows characteristics of gastric ulcer. I prescribed Pepcid and Bentyl and advised to follow-up with PCP regarding GI consult. No further work-up indicated at this time. Nursing notes reviewed. At this time this is what I find, the patient appears well and does not appear sick or toxic. I gave my usual and customary discussion of the risks and benefits of discharge versus admission. I answered the patient's questions. I gave the patient strict return precautions. Patient expressed understanding of the discharge instructions. Dictated but not reviewed. Vitals:    Vitals:    08/17/20 0432   BP: 135/85   Pulse: 84   Resp: 16   Temp: 98.9 °F (37.2 °C)   TempSrc: Oral   SpO2: 98%   Weight: 242 lb 1.6 oz (109.8 kg)   Height: 5' 9\" (1.753 m)       The patient was given the following medications while in the emergency department:  Orders Placed This Encounter   Medications    0.9 % sodium chloride bolus    morphine sulfate (PF) injection 4 mg    0.9 % sodium chloride bolus    iopamidol (ISOVUE-370) 76 % injection 75 mL    sodium chloride flush 0.9 % injection 10 mL    ondansetron (ZOFRAN) injection 4 mg    famotidine (PEPCID) injection 20 mg    famotidine (PEPCID) 40 MG tablet     Sig: Take 0.5 tablets by mouth daily     Dispense:  30 tablet     Refill:  0    dicyclomine (BENTYL) 10 MG capsule     Sig: Take 1 capsule by mouth 4 times daily (before meals and nightly)     Dispense:  120 capsule     Refill:  3     CONSULTS:  None    FINAL IMPRESSION      1. Hypercalcemia    2.  PUD (peptic ulcer disease)          DISPOSITION/PLAN   DISPOSITION Decision To Discharge 08/17/2020 06:53:42 AM      PATIENT REFERRED TO:  Gloria Salmon MD  [address] [facility] [address] [phone]    In 1 day      DISCHARGE MEDICATIONS:  New Prescriptions    DICYCLOMINE (BENTYL) 10 MG CAPSULE    Take 1 capsule by mouth 4 times daily (before meals and nightly)    FAMOTIDINE (PEPCID) 40 MG TABLET    Take 0.5 tablets by mouth daily     Elsi Castellano MD  Attending Emergency Physician                    Anali Cardona MD  08/17/20 0222

## 2020-08-17 NOTE — ED PROVIDER NOTES
Patient was discharged evaluated by Dr. Tomasa Villalba, however Dr. Tomasa Villalba discharged patient without signing prescriptions. I subsequently reprinted prescriptions for Bentyl and Pepcid were previously written by Dr. Jens Zhou.       Josefa Liang MD  50/72/31 [id number]

## 2020-08-18 ENCOUNTER — CARE COORDINATION (OUTPATIENT)
Dept: CARE COORDINATION | Age: 55
End: 2020-08-18

## 2020-08-18 NOTE — CARE COORDINATION
Patient contacted regarding recent visit for viral symptoms. This [facility] contacted the patient by telephone to perform post discharge call. Verified name and  with patient as identifiers. Provided introduction to self, and reason for call due to viral symptoms of infection and/or exposure to COVID-19. Patient presented to emergency department/flu clinic with complaints of viral symptoms/exposure to COVID. Patient reports symptoms are improving. Due to no new or worsening symptoms the RN CTN/ACM was not notified for escalation. Discussed exposure protocols and quarantine with CDC Guidelines What To Do If You Are Sick    Patient was given an opportunity for questions and concerns. Stay home except to get medical care    Separate yourself from other people and animals in your home    Call ahead before visiting your doctor    Wear a facemask    Cover your coughs and sneezes    Clean your hands often    Avoid sharing personal household items    Clean all high-touch surfaces everyday    Monitor your symptoms  Seek prompt medical attention if your illness is worsening (e.g., difficulty breathing). Before seeking care, call your healthcare provider and tell them that you have, or are being evaluated for, COVID-19. Put on a facemask before you enter the facility. These steps will help the healthcare provider's office to keep other people in the office or waiting room from getting infected or exposed. Ask your healthcare provider to call the local or [facility] health department. Persons who are placed under If you have a medical emergency and need to call 911, notify the dispatch personnel that you have, or are being evaluated for COVID-19. If possible, put on a facemask before emergency medical services arrive. The patient agrees to contact the [facility] exposure line [phone], local health department [place] [facility]: ([phone]) and PCP office for questions related to their healthcare. Author provided contact information for future reference. Patient/family/caregiver given information for [employer] and agrees to enroll -yes  Patient's preferred e-mail: n/a   Patient's preferred phone number: n/a  Based on Loop alert triggers, patient will be contacted by nurse care manager for worsening symptoms. Pt is doing better and taking bentyl and Pepcid. He states he will follow up with PCP this afternoon 8/18/20 at 4 pm.. Pt enrolled in LOOP.

## 2020-11-23 ENCOUNTER — HOSPITAL ENCOUNTER (OUTPATIENT)
Age: 55
Discharge: HOME | End: 2020-11-25
Payer: COMMERCIAL

## 2020-11-23 ENCOUNTER — HOSPITAL ENCOUNTER (OUTPATIENT)
Dept: GENERAL RADIOLOGY | Age: 55
Discharge: HOME | End: 2020-11-25
Payer: COMMERCIAL

## 2020-11-23 ENCOUNTER — HOSPITAL ENCOUNTER (OUTPATIENT)
Age: 55
Discharge: HOME | End: 2020-11-23
Payer: COMMERCIAL

## 2020-11-23 LAB
-: ABNORMAL
AMORPHOUS: ABNORMAL
ANION GAP SERPL CALCULATED.3IONS-SCNC: 11 MMOL/L (ref 9–17)
BACTERIA: ABNORMAL
BILIRUBIN URINE: NEGATIVE
BUN BLDV-MCNC: 32 MG/DL (ref 6–20)
BUN BLDV-MCNC: 32 MG/DL (ref 6–20)
BUN/CREAT BLD: 16 (ref 9–20)
CALCIUM SERPL-MCNC: 9.7 MG/DL (ref 8.6–10.4)
CASTS UA: ABNORMAL /LPF
CHLORIDE BLD-SCNC: 105 MMOL/L (ref 98–107)
CO2: 22 MMOL/L (ref 20–31)
COLOR: YELLOW
COMMENT UA: ABNORMAL
CREAT SERPL-MCNC: 1.96 MG/DL (ref 0.7–1.2)
CREAT SERPL-MCNC: 1.97 MG/DL (ref 0.7–1.2)
CREATININE URINE: 112 MG/DL (ref 39–259)
CRYSTALS, UA: ABNORMAL /HPF
EPITHELIAL CELLS UA: ABNORMAL /HPF (ref 0–5)
GFR AFRICAN AMERICAN: 43 ML/MIN
GFR AFRICAN AMERICAN: 43 ML/MIN
GFR NON-AFRICAN AMERICAN: 35 ML/MIN
GFR NON-AFRICAN AMERICAN: 35 ML/MIN
GFR SERPL CREATININE-BSD FRML MDRD: ABNORMAL ML/MIN/{1.73_M2}
GLUCOSE BLD-MCNC: 85 MG/DL (ref 70–99)
GLUCOSE URINE: NEGATIVE
HCT VFR BLD CALC: 34.3 % (ref 40.7–50.3)
HEMOGLOBIN: 11.3 G/DL (ref 13–17)
KETONES, URINE: NEGATIVE
LEUKOCYTE ESTERASE, URINE: NEGATIVE
MCH RBC QN AUTO: 32.5 PG (ref 25.2–33.5)
MCHC RBC AUTO-ENTMCNC: 32.9 G/DL (ref 28.4–34.8)
MCV RBC AUTO: 98.6 FL (ref 82.6–102.9)
MUCUS: ABNORMAL
NITRITE, URINE: NEGATIVE
NRBC AUTOMATED: 0 PER 100 WBC
OTHER OBSERVATIONS UA: ABNORMAL
PDW BLD-RTO: 12.3 % (ref 11.8–14.4)
PH UA: 6 (ref 5–8)
PLATELET # BLD: 222 K/UL (ref 138–453)
PMV BLD AUTO: 10.5 FL (ref 8.1–13.5)
POTASSIUM SERPL-SCNC: 4 MMOL/L (ref 3.7–5.3)
PROSTATE SPECIFIC ANTIGEN: <0.01 UG/L
PROTEIN UA: NEGATIVE
RBC # BLD: 3.48 M/UL (ref 4.21–5.77)
RBC UA: ABNORMAL /HPF (ref 0–2)
RENAL EPITHELIAL, UA: ABNORMAL /HPF
SODIUM BLD-SCNC: 138 MMOL/L (ref 135–144)
SPECIFIC GRAVITY UA: 1.02 (ref 1–1.03)
TOTAL PROTEIN, URINE: 17 MG/DL
TRICHOMONAS: ABNORMAL
TURBIDITY: CLEAR
URINE HGB: ABNORMAL
URINE TOTAL PROTEIN CREATININE RATIO: 0.15 (ref 0–0.2)
UROBILINOGEN, URINE: NORMAL
WBC # BLD: 8.5 K/UL (ref 3.5–11.3)
WBC UA: ABNORMAL /HPF (ref 0–5)
YEAST: ABNORMAL

## 2020-11-23 PROCEDURE — 84156 ASSAY OF PROTEIN URINE: CPT

## 2020-11-23 PROCEDURE — 82565 ASSAY OF CREATININE: CPT

## 2020-11-23 PROCEDURE — 80048 BASIC METABOLIC PNL TOTAL CA: CPT

## 2020-11-23 PROCEDURE — 85027 COMPLETE CBC AUTOMATED: CPT

## 2020-11-23 PROCEDURE — 81001 URINALYSIS AUTO W/SCOPE: CPT

## 2020-11-23 PROCEDURE — 82570 ASSAY OF URINE CREATININE: CPT

## 2020-11-23 PROCEDURE — 36415 COLL VENOUS BLD VENIPUNCTURE: CPT

## 2020-11-23 PROCEDURE — 84520 ASSAY OF UREA NITROGEN: CPT

## 2020-11-23 PROCEDURE — 84153 ASSAY OF PSA TOTAL: CPT

## 2020-11-23 PROCEDURE — 74018 RADEX ABDOMEN 1 VIEW: CPT

## 2020-12-05 ENCOUNTER — HOSPITAL ENCOUNTER (OUTPATIENT)
Age: 55
Discharge: HOME | End: 2020-12-05
Payer: COMMERCIAL

## 2020-12-05 LAB — URIC ACID: 9.5 MG/DL (ref 3.4–7)

## 2020-12-05 PROCEDURE — 84550 ASSAY OF BLOOD/URIC ACID: CPT

## 2020-12-05 PROCEDURE — 36415 COLL VENOUS BLD VENIPUNCTURE: CPT

## 2021-01-11 ENCOUNTER — HOSPITAL ENCOUNTER (OUTPATIENT)
Age: 56
Setting detail: SPECIMEN
Discharge: HOME | End: 2021-01-11
Payer: COMMERCIAL

## 2021-01-11 DIAGNOSIS — R31.9 HEMATURIA, UNSPECIFIED TYPE: ICD-10-CM

## 2022-04-25 ENCOUNTER — HOSPITAL ENCOUNTER (OUTPATIENT)
Age: 57
Setting detail: SPECIMEN
Discharge: HOME | End: 2022-04-25

## 2022-04-25 DIAGNOSIS — N64.4 BREAST TENDERNESS: ICD-10-CM

## 2022-04-26 LAB — ESTRADIOL LEVEL: 22.5 PG/ML (ref 27–52)

## 2023-09-08 ENCOUNTER — HOSPITAL ENCOUNTER (OUTPATIENT)
Age: 58
Setting detail: SPECIMEN
Discharge: HOME | End: 2023-09-08

## 2023-09-08 ENCOUNTER — HOSPITAL ENCOUNTER (OUTPATIENT)
Dept: GENERAL RADIOLOGY | Facility: CLINIC | Age: 58
End: 2023-09-08
Payer: COMMERCIAL

## 2023-09-08 ENCOUNTER — HOSPITAL ENCOUNTER (OUTPATIENT)
Dept: GENERAL RADIOLOGY | Facility: CLINIC | Age: 58
Discharge: HOME | End: 2023-09-08
Payer: COMMERCIAL

## 2023-09-08 DIAGNOSIS — E78.5 HYPERLIPIDEMIA, UNSPECIFIED HYPERLIPIDEMIA TYPE: ICD-10-CM

## 2023-09-08 DIAGNOSIS — E16.2 HYPOGLYCEMIA: ICD-10-CM

## 2023-09-08 DIAGNOSIS — M25.561 PAIN IN BOTH KNEES, UNSPECIFIED CHRONICITY: ICD-10-CM

## 2023-09-08 DIAGNOSIS — E55.9 VITAMIN D DEFICIENCY: ICD-10-CM

## 2023-09-08 DIAGNOSIS — R53.83 OTHER FATIGUE: ICD-10-CM

## 2023-09-08 DIAGNOSIS — M25.562 PAIN IN BOTH KNEES, UNSPECIFIED CHRONICITY: ICD-10-CM

## 2023-09-08 DIAGNOSIS — R35.1 NOCTURIA: ICD-10-CM

## 2023-09-08 LAB
BASOPHILS # BLD: 0.04 K/UL (ref 0–0.2)
BASOPHILS NFR BLD: 1 % (ref 0–2)
EOSINOPHIL # BLD: 0.22 K/UL (ref 0–0.44)
EOSINOPHILS RELATIVE PERCENT: 3 % (ref 1–4)
ERYTHROCYTE [DISTWIDTH] IN BLOOD BY AUTOMATED COUNT: 12.2 % (ref 11.8–14.4)
ERYTHROCYTE [SEDIMENTATION RATE] IN BLOOD BY PHOTOMETRIC METHOD: 38 MM/HR (ref 0–20)
HCT VFR BLD AUTO: 38.9 % (ref 40.7–50.3)
HGB BLD-MCNC: 12.6 G/DL (ref 13–17)
IMM GRANULOCYTES # BLD AUTO: 0.03 K/UL (ref 0–0.3)
IMM GRANULOCYTES NFR BLD: 0 %
LYMPHOCYTES NFR BLD: 3.49 K/UL (ref 1.1–3.7)
LYMPHOCYTES RELATIVE PERCENT: 49 % (ref 24–43)
MCH RBC QN AUTO: 32 PG (ref 25.2–33.5)
MCHC RBC AUTO-ENTMCNC: 32.4 G/DL (ref 28.4–34.8)
MCV RBC AUTO: 98.7 FL (ref 82.6–102.9)
MONOCYTES NFR BLD: 0.65 K/UL (ref 0.1–1.2)
MONOCYTES NFR BLD: 9 % (ref 3–12)
NEUTROPHILS NFR BLD: 38 % (ref 36–65)
NEUTS SEG NFR BLD: 2.72 K/UL (ref 1.5–8.1)
NRBC BLD-RTO: 0 PER 100 WBC
PLATELET # BLD AUTO: 244 K/UL (ref 138–453)
PMV BLD AUTO: 10.6 FL (ref 8.1–13.5)
RBC # BLD AUTO: 3.94 M/UL (ref 4.21–5.77)
WBC OTHER # BLD: 7.2 K/UL (ref 3.5–11.3)

## 2023-09-08 PROCEDURE — 73565 X-RAY EXAM OF KNEES: CPT

## 2023-09-08 PROCEDURE — 73562 X-RAY EXAM OF KNEE 3: CPT

## 2023-09-09 LAB
25(OH)D3 SERPL-MCNC: 19.6 NG/ML
ALT SERPL-CCNC: 53 U/L (ref 5–41)
ANION GAP SERPL CALCULATED.3IONS-SCNC: 15 MMOL/L (ref 9–17)
AST SERPL-CCNC: 36 U/L
BUN SERPL-MCNC: 23 MG/DL (ref 8–23)
CALCIUM SERPL-MCNC: 9.6 MG/DL (ref 8.6–10.4)
CHLORIDE SERPL-SCNC: 104 MMOL/L (ref 98–107)
CHOLEST SERPL-MCNC: 169 MG/DL
CHOLESTEROL/HDL RATIO: 5.6
CO2 SERPL-SCNC: 19 MMOL/L (ref 20–31)
CREAT SERPL-MCNC: 1.8 MG/DL (ref 0.7–1.2)
CRP SERPL HS-MCNC: 4.4 MG/L (ref 0–5)
FOLATE SERPL-MCNC: 9.9 NG/ML
GFR SERPL CREATININE-BSD FRML MDRD: 43 ML/MIN/1.73M2
GLUCOSE P FAST SERPL-MCNC: 88 MG/DL (ref 70–99)
HDLC SERPL-MCNC: 30 MG/DL
LDLC SERPL CALC-MCNC: 80 MG/DL (ref 0–130)
POTASSIUM SERPL-SCNC: 4.3 MMOL/L (ref 3.7–5.3)
PSA SERPL-MCNC: <0.02 NG/ML
RHEUMATOID FACT SER NEPH-ACNC: <10 IU/ML
SHBG SERPL-SCNC: 40 NMOL/L (ref 11–80)
SODIUM SERPL-SCNC: 138 MMOL/L (ref 135–144)
T4 FREE SERPL-MCNC: 0.9 NG/DL (ref 0.9–1.7)
TESTOST FREE MFR SERPL: 62.3 PG/ML (ref 47–244)
TESTOST SERPL-MCNC: 351 NG/DL (ref 220–1000)
TRIGL SERPL-MCNC: 294 MG/DL
TSH SERPL DL<=0.05 MIU/L-ACNC: 0.83 UIU/ML (ref 0.3–5)
VIT B12 SERPL-MCNC: 755 PG/ML (ref 232–1245)

## 2023-09-10 LAB
EST. AVERAGE GLUCOSE BLD GHB EST-MCNC: 94 MG/DL
HBA1C MFR BLD: 4.9 % (ref 4–6)

## 2023-11-20 RX ORDER — FINERENONE 10 MG/1
TABLET, FILM COATED ORAL
Qty: 90 TABLET | Refills: 3 | OUTPATIENT
Start: 2023-11-20

## 2024-04-22 ENCOUNTER — HOSPITAL ENCOUNTER (OUTPATIENT)
Age: 59
Discharge: HOME | End: 2024-04-22
Payer: COMMERCIAL

## 2024-04-22 DIAGNOSIS — N18.32 STAGE 3B CHRONIC KIDNEY DISEASE (HCC): ICD-10-CM

## 2024-04-22 DIAGNOSIS — I10 PRIMARY HYPERTENSION: ICD-10-CM

## 2024-04-22 DIAGNOSIS — E79.0 ELEVATED URIC ACID IN BLOOD: ICD-10-CM

## 2024-04-22 LAB
ANION GAP SERPL CALCULATED.3IONS-SCNC: 11 MMOL/L (ref 9–16)
BUN SERPL-MCNC: 21 MG/DL (ref 8–23)
CA-I BLD-SCNC: 1.34 MMOL/L (ref 1.13–1.33)
CALCIUM SERPL-MCNC: 9.5 MG/DL (ref 8.6–10.4)
CHLORIDE SERPL-SCNC: 108 MMOL/L (ref 98–107)
CO2 SERPL-SCNC: 22 MMOL/L (ref 20–31)
CREAT SERPL-MCNC: 1.9 MG/DL (ref 0.7–1.2)
CREAT UR-MCNC: 471 MG/DL (ref 39–259)
ERYTHROCYTE [DISTWIDTH] IN BLOOD BY AUTOMATED COUNT: 13.2 % (ref 11.8–14.4)
GFR SERPL CREATININE-BSD FRML MDRD: 40 ML/MIN/1.73M2
GLUCOSE SERPL-MCNC: 113 MG/DL (ref 74–99)
HCT VFR BLD AUTO: 34 % (ref 40.7–50.3)
HGB BLD-MCNC: 11.1 G/DL (ref 13–17)
MCH RBC QN AUTO: 31.8 PG (ref 25.2–33.5)
MCHC RBC AUTO-ENTMCNC: 32.6 G/DL (ref 28.4–34.8)
MCV RBC AUTO: 97.4 FL (ref 82.6–102.9)
NRBC BLD-RTO: 0 PER 100 WBC
PLATELET # BLD AUTO: 232 K/UL (ref 138–453)
PMV BLD AUTO: 10.7 FL (ref 8.1–13.5)
POTASSIUM SERPL-SCNC: 4.3 MMOL/L (ref 3.7–5.3)
PTH-INTACT SERPL-MCNC: 47 PG/ML (ref 15–65)
RBC # BLD AUTO: 3.49 M/UL (ref 4.21–5.77)
SODIUM SERPL-SCNC: 141 MMOL/L (ref 136–145)
TOTAL PROTEIN, URINE: 275 MG/DL
URATE SERPL-MCNC: 5.9 MG/DL (ref 3.4–7)
URINE TOTAL PROTEIN CREATININE RATIO: 0.58
WBC OTHER # BLD: 5.4 K/UL (ref 3.5–11.3)

## 2024-04-22 PROCEDURE — 82570 ASSAY OF URINE CREATININE: CPT

## 2024-04-22 PROCEDURE — 36415 COLL VENOUS BLD VENIPUNCTURE: CPT

## 2024-04-22 PROCEDURE — 84550 ASSAY OF BLOOD/URIC ACID: CPT

## 2024-04-22 PROCEDURE — 80048 BASIC METABOLIC PNL TOTAL CA: CPT

## 2024-04-22 PROCEDURE — 82330 ASSAY OF CALCIUM: CPT

## 2024-04-22 PROCEDURE — 83970 ASSAY OF PARATHORMONE: CPT

## 2024-04-22 PROCEDURE — 85027 COMPLETE CBC AUTOMATED: CPT

## 2024-04-22 PROCEDURE — 84156 ASSAY OF PROTEIN URINE: CPT

## 2024-04-23 DIAGNOSIS — Z85.46 PERSONAL HISTORY OF MALIGNANT NEOPLASM OF PROSTATE: Primary | ICD-10-CM

## 2024-05-06 ENCOUNTER — OFFICE VISIT (OUTPATIENT)
Dept: NEUROLOGY | Age: 59
End: 2024-05-06
Payer: COMMERCIAL

## 2024-05-06 VITALS
WEIGHT: 270 LBS | HEART RATE: 82 BPM | BODY MASS INDEX: 39.99 KG/M2 | DIASTOLIC BLOOD PRESSURE: 79 MMHG | HEIGHT: 69 IN | SYSTOLIC BLOOD PRESSURE: 139 MMHG

## 2024-05-06 DIAGNOSIS — G62.9 NEUROPATHY: Primary | ICD-10-CM

## 2024-05-06 PROCEDURE — 3075F SYST BP GE 130 - 139MM HG: CPT | Performed by: STUDENT IN AN ORGANIZED HEALTH CARE EDUCATION/TRAINING PROGRAM

## 2024-05-06 PROCEDURE — 3078F DIAST BP <80 MM HG: CPT | Performed by: STUDENT IN AN ORGANIZED HEALTH CARE EDUCATION/TRAINING PROGRAM

## 2024-05-06 PROCEDURE — 99204 OFFICE O/P NEW MOD 45 MIN: CPT | Performed by: STUDENT IN AN ORGANIZED HEALTH CARE EDUCATION/TRAINING PROGRAM

## 2024-05-06 RX ORDER — DULOXETIN HYDROCHLORIDE 30 MG/1
30 CAPSULE, DELAYED RELEASE ORAL DAILY
Qty: 30 CAPSULE | Refills: 3 | Status: SHIPPED | OUTPATIENT
Start: 2024-05-06

## 2024-05-13 ENCOUNTER — HOSPITAL ENCOUNTER (OUTPATIENT)
Age: 59
Setting detail: SPECIMEN
Discharge: HOME | End: 2024-05-13
Payer: COMMERCIAL

## 2024-05-13 DIAGNOSIS — E79.0 ELEVATED URIC ACID IN BLOOD: ICD-10-CM

## 2024-05-13 DIAGNOSIS — Z85.46 PERSONAL HISTORY OF MALIGNANT NEOPLASM OF PROSTATE: ICD-10-CM

## 2024-05-13 DIAGNOSIS — N18.32 STAGE 3B CHRONIC KIDNEY DISEASE (HCC): ICD-10-CM

## 2024-05-13 DIAGNOSIS — I10 PRIMARY HYPERTENSION: ICD-10-CM

## 2024-05-13 DIAGNOSIS — G62.9 NEUROPATHY: ICD-10-CM

## 2024-05-13 LAB
25(OH)D3 SERPL-MCNC: 16.3 NG/ML (ref 30–100)
ANION GAP SERPL CALCULATED.3IONS-SCNC: 13 MMOL/L (ref 9–16)
BUN SERPL-MCNC: 23 MG/DL (ref 8–23)
CALCIUM SERPL-MCNC: 9.4 MG/DL (ref 8.6–10.4)
CHLORIDE SERPL-SCNC: 103 MMOL/L (ref 98–107)
CO2 SERPL-SCNC: 21 MMOL/L (ref 20–31)
CREAT SERPL-MCNC: 1.9 MG/DL (ref 0.7–1.2)
CREAT UR-MCNC: 300 MG/DL (ref 39–259)
ERYTHROCYTE [DISTWIDTH] IN BLOOD BY AUTOMATED COUNT: 13.2 % (ref 11.8–14.4)
EST. AVERAGE GLUCOSE BLD GHB EST-MCNC: 94 MG/DL
FOLATE SERPL-MCNC: 5.9 NG/ML (ref 4.8–24.2)
GFR, ESTIMATED: 41 ML/MIN/1.73M2
GLUCOSE SERPL-MCNC: 112 MG/DL (ref 74–99)
HBA1C MFR BLD: 4.9 % (ref 4–6)
HCT VFR BLD AUTO: 35.1 % (ref 40.7–50.3)
HGB BLD-MCNC: 11.4 G/DL (ref 13–17)
MCH RBC QN AUTO: 31.3 PG (ref 25.2–33.5)
MCHC RBC AUTO-ENTMCNC: 32.5 G/DL (ref 28.4–34.8)
MCV RBC AUTO: 96.4 FL (ref 82.6–102.9)
NRBC BLD-RTO: 0 PER 100 WBC
PLATELET # BLD AUTO: 240 K/UL (ref 138–453)
PMV BLD AUTO: 10.6 FL (ref 8.1–13.5)
POTASSIUM SERPL-SCNC: 4 MMOL/L (ref 3.7–5.3)
PSA SERPL-MCNC: <0.03 NG/ML (ref 0–4)
RBC # BLD AUTO: 3.64 M/UL (ref 4.21–5.77)
SODIUM SERPL-SCNC: 137 MMOL/L (ref 136–145)
TOTAL PROTEIN, URINE: 205 MG/DL
URINE TOTAL PROTEIN CREATININE RATIO: 0.68
VIT B12 SERPL-MCNC: 693 PG/ML (ref 232–1245)
WBC OTHER # BLD: 5.7 K/UL (ref 3.5–11.3)

## 2024-05-13 PROCEDURE — 36415 COLL VENOUS BLD VENIPUNCTURE: CPT

## 2024-05-13 PROCEDURE — 85027 COMPLETE CBC AUTOMATED: CPT

## 2024-05-13 PROCEDURE — 84155 ASSAY OF PROTEIN SERUM: CPT

## 2024-05-13 PROCEDURE — 84153 ASSAY OF PSA TOTAL: CPT

## 2024-05-13 PROCEDURE — 86334 IMMUNOFIX E-PHORESIS SERUM: CPT

## 2024-05-13 PROCEDURE — 82306 VITAMIN D 25 HYDROXY: CPT

## 2024-05-13 PROCEDURE — 83036 HEMOGLOBIN GLYCOSYLATED A1C: CPT

## 2024-05-13 PROCEDURE — 82607 VITAMIN B-12: CPT

## 2024-05-13 PROCEDURE — 84165 PROTEIN E-PHORESIS SERUM: CPT

## 2024-05-13 PROCEDURE — 82746 ASSAY OF FOLIC ACID SERUM: CPT

## 2024-05-13 PROCEDURE — 84156 ASSAY OF PROTEIN URINE: CPT

## 2024-05-13 PROCEDURE — 80048 BASIC METABOLIC PNL TOTAL CA: CPT

## 2024-05-13 PROCEDURE — 82570 ASSAY OF URINE CREATININE: CPT

## 2024-05-13 PROCEDURE — 86235 NUCLEAR ANTIGEN ANTIBODY: CPT

## 2024-05-14 LAB
DEPRECATED S PNEUM5 IGG SER-MCNC: 0.4 U/ML
ENA JO1 IGG SER-ACNC: <0.4 U/ML
ENA SCL70 AB SER IA-ACNC: 1.4 U/ML
ENA SM AB SER-ACNC: <0.8 U/ML
ENA SS-A IGG SER QL: 0.4 U/ML
ENA SS-B IGG SER IA-ACNC: <0.3 U/ML
U1 SNRNP IGG SER IA-ACNC: 1.2 U/ML
U1 SNRNP IGG SER IA-ACNC: 1.8 U/ML

## 2024-05-15 LAB
ALBUMIN PERCENT: 55 % (ref 45–65)
ALBUMIN SERPL-MCNC: 4.2 G/DL (ref 3.2–5.2)
ALPHA 2 PERCENT: 10 % (ref 6–13)
ALPHA1 GLOB SERPL ELPH-MCNC: 0.3 G/DL (ref 0.1–0.4)
ALPHA1 GLOB SERPL ELPH-MCNC: 4 % (ref 3–6)
ALPHA2 GLOB SERPL ELPH-MCNC: 0.8 G/DL (ref 0.5–0.9)
B-GLOBULIN SERPL ELPH-MCNC: 1.1 G/DL (ref 0.5–1.1)
B-GLOBULIN SERPL ELPH-MCNC: 14 % (ref 11–19)
GAMMA GLOB SERPL ELPH-MCNC: 1.3 G/DL (ref 0.5–1.5)
GAMMA GLOBULIN %: 17 % (ref 9–20)
ITYP INTERPRETATION: NORMAL
PATH REV: NORMAL
PATHOLOGIST: NORMAL
PROT PATTERN SERPL ELPH-IMP: NORMAL
PROT SERPL-MCNC: 7.7 G/DL (ref 6.6–8.7)
TOTAL PROT. SUM,%: 100 % (ref 98–102)
TOTAL PROT. SUM: 7.7 G/DL (ref 6.3–8.2)

## 2024-05-19 ENCOUNTER — HOSPITAL ENCOUNTER (EMERGENCY)
Age: 59
Discharge: HOME | End: 2024-05-19
Attending: EMERGENCY MEDICINE
Payer: COMMERCIAL

## 2024-05-19 VITALS
DIASTOLIC BLOOD PRESSURE: 96 MMHG | BODY MASS INDEX: 39.25 KG/M2 | TEMPERATURE: 97.9 F | HEART RATE: 71 BPM | RESPIRATION RATE: 18 BRPM | SYSTOLIC BLOOD PRESSURE: 136 MMHG | OXYGEN SATURATION: 100 % | HEIGHT: 69 IN | WEIGHT: 265 LBS

## 2024-05-19 DIAGNOSIS — M10.9 GOUTY ARTHRITIS OF LEFT GREAT TOE: Primary | ICD-10-CM

## 2024-05-19 PROCEDURE — 99283 EMERGENCY DEPT VISIT LOW MDM: CPT

## 2024-05-19 PROCEDURE — 6370000000 HC RX 637 (ALT 250 FOR IP): Performed by: NURSE PRACTITIONER

## 2024-05-19 RX ORDER — PREDNISONE 20 MG/1
40 TABLET ORAL ONCE
Status: COMPLETED | OUTPATIENT
Start: 2024-05-19 | End: 2024-05-19

## 2024-05-19 RX ORDER — HYDROCODONE BITARTRATE AND ACETAMINOPHEN 5; 325 MG/1; MG/1
1 TABLET ORAL EVERY 6 HOURS PRN
Qty: 12 TABLET | Refills: 0 | Status: SHIPPED | OUTPATIENT
Start: 2024-05-19 | End: 2024-05-22

## 2024-05-19 RX ORDER — PREDNISONE 20 MG/1
40 TABLET ORAL DAILY
Qty: 8 TABLET | Refills: 0 | Status: SHIPPED | OUTPATIENT
Start: 2024-05-19 | End: 2024-05-23

## 2024-05-19 RX ADMIN — PREDNISONE 40 MG: 20 TABLET ORAL at 11:42

## 2024-05-19 ASSESSMENT — LIFESTYLE VARIABLES
HOW MANY STANDARD DRINKS CONTAINING ALCOHOL DO YOU HAVE ON A TYPICAL DAY: 1 OR 2
HOW OFTEN DO YOU HAVE A DRINK CONTAINING ALCOHOL: MONTHLY OR LESS

## 2024-05-19 ASSESSMENT — PAIN - FUNCTIONAL ASSESSMENT: PAIN_FUNCTIONAL_ASSESSMENT: 0-10

## 2024-05-19 ASSESSMENT — PAIN DESCRIPTION - ORIENTATION: ORIENTATION: LEFT;RIGHT

## 2024-05-19 ASSESSMENT — PAIN DESCRIPTION - LOCATION: LOCATION: FOOT

## 2024-05-19 ASSESSMENT — PAIN SCALES - GENERAL: PAINLEVEL_OUTOF10: 8

## 2024-05-19 NOTE — ED TRIAGE NOTES
Patient comes in with bilateral foot pain, states that he may be having a gout flare up. Has been dealing with this for a week.

## 2024-05-19 NOTE — DISCHARGE INSTRUCTIONS
Take medication as prescribed.  Norco can cause drowsiness.  Follow-up with your primary care provider.  Return to emergency department for worsening or new symptoms.

## 2024-05-19 NOTE — ED NOTES
Patient presents to ED from home by self with c/o right foot, primarily big toe, pain secondary to gout flare up. Patient is able to ambulate, but with pain, rates pain a 8/10.

## 2024-05-20 NOTE — ED PROVIDER NOTES
eMERGENCY dEPARTMENT eNCOUnter   Independent Attestation     Pt Name: Xu Messer  MRN: [id number]  Birthdate 1965  Date of evaluation: 5/20/24     Xu Messer is a 58 y.o. male with CC: Gout and Foot Pain (Gout flare up)        This visit was performed by both a physician and an APC. I performed all aspects of the MDM as documented.      Juan Wilkins MD  Attending Emergency Physician            Juan Wilkins MD  05/20/24 1142    
136/96   Pulse: 71   Resp: 18   Temp: 97.9 °F (36.6 °C)   TempSrc: Oral   SpO2: 100%   Weight: 120.2 kg (265 lb)   Height: 1.753 m (5' 9\")         MEDICATIONS GIVEN IN THE ED:  Medications   predniSONE (DELTASONE) tablet 40 mg (40 mg Oral Given 5/19/24 1142)       CLINICAL DECISION MAKING:  The patient presented alert with a nontoxic appearance and was seen in conjunction with Dr. Wilkins.     DDx include gout, cellulitis, abscess    Test considered, but not ordered: None    The patient was involved in his/her plan of care through shared decision making. The testing that was ordered was discussed with the patient. Any medications that may have been ordered were discussed with the patient.       I have reviewed the patient's previous medical records using the electronic health record that we have available.      Exam shows gout of left great toe.  Patient is afebrile.  History of CKD will avoid NSAIDs.  Will place on short course of prednisone and provided with prescription for Norco.  OARRS reviewed.  Provided with a postop shoe for comfort.  Discussed diagnosis, treatment follow-up care with the patient    Evaluation and treatment course in the ED, and plan of care upon discharge was discussed in length with the patient. Patient had no further questions prior to being discharged and was instructed to return to the ED for new or worsening symptoms.          CONSULTS:  None    PROCEDURES:  Procedures    FINAL IMPRESSION      1. Gouty arthritis of left great toe            Problem List  Patient Active Problem List   Diagnosis Code    HTN (hypertension) I10    Prostate cancer (HCC) C61    Calculus of kidney N20.0    Personal history of malignant neoplasm of prostate Z85.46    Other male erectile dysfunction N52.8    Microscopic hematuria R31.29    Nocturia R35.1    Urinary frequency R35.0    Microhematuria R31.29    Smoker F17.200    Pseudofolliculitis barbae L73.1    Folliculitis barbae L73.8    Hypokalemia E87.6

## 2024-05-22 ENCOUNTER — TELEPHONE (OUTPATIENT)
Dept: NEUROLOGY | Age: 59
End: 2024-05-22

## 2024-05-22 NOTE — TELEPHONE ENCOUNTER
Patient called into office stating he was having some pain in his feet. He said that it is the same pain he was having when he went to the ER and was treated for gout. He is asking if this could be nerve pain vs gout pain. Writer advised patient that he should follow with PCP if the pain is same as before. He stated that he is out of time at this time and that he may go to urgent care if the pain does not improve.

## 2024-07-08 DIAGNOSIS — G62.9 NEUROPATHY: Primary | ICD-10-CM

## 2024-07-08 RX ORDER — DULOXETIN HYDROCHLORIDE 30 MG/1
30 CAPSULE, DELAYED RELEASE ORAL DAILY
Qty: 30 CAPSULE | Refills: 3 | OUTPATIENT
Start: 2024-07-08

## 2024-08-05 ENCOUNTER — OFFICE VISIT (OUTPATIENT)
Age: 59
End: 2024-08-05
Payer: COMMERCIAL

## 2024-08-05 VITALS — BODY MASS INDEX: 39.25 KG/M2 | WEIGHT: 265 LBS | HEIGHT: 69 IN

## 2024-08-05 DIAGNOSIS — Z85.46 PERSONAL HISTORY OF MALIGNANT NEOPLASM OF PROSTATE: ICD-10-CM

## 2024-08-05 DIAGNOSIS — N28.1 COMPLEX RENAL CYST: Primary | ICD-10-CM

## 2024-08-05 DIAGNOSIS — N52.8 OTHER MALE ERECTILE DYSFUNCTION: ICD-10-CM

## 2024-08-05 LAB
BILIRUBIN, POC: NORMAL
BLOOD URINE, POC: NORMAL
CLARITY, POC: CLEAR
COLOR, POC: YELLOW
GLUCOSE URINE, POC: NORMAL
KETONES, POC: NORMAL
LEUKOCYTE EST, POC: NORMAL
NITRITE, POC: NORMAL
PH, POC: NORMAL
PROTEIN, POC: NORMAL
SPECIFIC GRAVITY, POC: NORMAL
UROBILINOGEN, POC: NORMAL

## 2024-08-05 PROCEDURE — 81003 URINALYSIS AUTO W/O SCOPE: CPT | Performed by: SPECIALIST

## 2024-08-05 PROCEDURE — 99214 OFFICE O/P EST MOD 30 MIN: CPT | Performed by: SPECIALIST

## 2024-08-05 NOTE — PROGRESS NOTES
Hung Romano MD FACS    [facility] Urology Office Progress Note    Patient:  Xu Messer  YOB: 1965  Date: 8/5/2024    HISTORY OF PRESENT ILLNESS:   The patient is a 58 y.o. male  No evidence of prostate cancer recurrence s/p 4/18/14 Robotic assisted laparoscopic radical prostatectomy since PSA is <0.03.   Patient noted to have a 16 mm hemorrhagic or proteinaceous cyst on 11/10/22 MRI; No further urologic workup or intervention required.  Patient inflatable penile prosthesis is still working well.   Lower urinary tract symptoms: urgency, frequency, hesitancy, decreased urinary stream, and nocturia, 4 times per night   Last AUA Symptom Score (QOL): 16 (2)  Today's AUA Symptom Score (QOL): 18 (3)    Summary of old records:    History of Prostate Cancer: bx 1/29/14, RALP 4/8/14; T1c T3a N0 G7 with negative margins  ED: triple mix 0.95 mL; history of priapism. Using Bimix 0.8 mL; ? Left corporal nodule near base; Rx for generic sildenafil 100 mg (1/2-1 tab) prn ED and Tadalafil (Cialis) 20 mg prn; 5/25/21 IPP  Blateral adrenal adenomas on 8/17/20 CT  Left renal cyst, 8 mm on 2/12/18 renal U/S; 11/10/22 MRI: 16 mm hemorrhagic or proteinaceous cyst  Microhematuria: 1/24/14 CT 4 mm L KS, cysto-BPH; 5/15/17 cysto neg for BNC  Frequency every 2-3 hr, off Vesicare 10 mg qd  Nocturia x 2  Left epididymitis, left hydrocele on 7/16/18 scrotal U/S, better with Levaquin x 10d, recurred 8/13/18, given 28 days    Additional History: none    Procedures Today: N/A    Urinalysis today:  Results for POC orders placed in visit on 08/05/24   POCT Urinalysis No Micro (Auto)   Result Value Ref Range    Color, UA yellow     Clarity, UA clear     Glucose, UA POC neg     Bilirubin, UA      Ketones, UA      Spec Grav, UA      Blood, UA POC trace     pH, UA      Protein, UA POC >=300 mg     Urobilinogen, UA      Leukocytes, UA neg     Nitrite, UA neg        Last several PSA's:  Lab Results   Component

## 2024-10-02 ENCOUNTER — APPOINTMENT (OUTPATIENT)
Dept: GENERAL RADIOLOGY | Age: 59
End: 2024-10-02
Payer: COMMERCIAL

## 2024-10-02 ENCOUNTER — HOSPITAL ENCOUNTER (EMERGENCY)
Age: 59
Discharge: HOME | End: 2024-10-02
Attending: EMERGENCY MEDICINE
Payer: COMMERCIAL

## 2024-10-02 VITALS
OXYGEN SATURATION: 96 % | RESPIRATION RATE: 14 BRPM | TEMPERATURE: 98.1 F | HEART RATE: 69 BPM | WEIGHT: 250 LBS | SYSTOLIC BLOOD PRESSURE: 127 MMHG | HEIGHT: 69 IN | BODY MASS INDEX: 37.03 KG/M2 | DIASTOLIC BLOOD PRESSURE: 83 MMHG

## 2024-10-02 DIAGNOSIS — M54.12 CERVICAL RADICULOPATHY: Primary | ICD-10-CM

## 2024-10-02 PROCEDURE — 72040 X-RAY EXAM NECK SPINE 2-3 VW: CPT

## 2024-10-02 PROCEDURE — 99283 EMERGENCY DEPT VISIT LOW MDM: CPT

## 2024-10-02 PROCEDURE — 72072 X-RAY EXAM THORAC SPINE 3VWS: CPT

## 2024-10-02 RX ORDER — PREDNISONE 50 MG/1
50 TABLET ORAL DAILY
Qty: 5 TABLET | Refills: 0 | Status: SHIPPED | OUTPATIENT
Start: 2024-10-02 | End: 2024-10-07

## 2024-10-02 ASSESSMENT — PAIN - FUNCTIONAL ASSESSMENT: PAIN_FUNCTIONAL_ASSESSMENT: 0-10

## 2024-10-02 ASSESSMENT — PAIN SCALES - GENERAL: PAINLEVEL_OUTOF10: 6

## 2024-10-02 NOTE — ED PROVIDER NOTES
EMERGENCY DEPARTMENT ENCOUNTER    Pt Name: Xu Messer  MRN: [id number]  Birthdate 1965  Date of evaluation: 10/2/24      Patient sign out from Dr. Wilkins.    Treatment and Disposition    Patient repeat assessment: Patient is stable.  Images obtained of cervical and thoracic spine which are negative for acute osseous injury.  Patient has standing order for EMG.  Given number for central scheduling department to schedule.  Provided Rx for prednisone.  Advised Tylenol for 3 days for symptomatic.  All questions answered.  Given strict return precautions.  No further work-up indicated at this time.    Disposition discussion with patient/family: Patient aware and agrees with disposition plan.    MIPS:  N/A    Social determinants of health impacting treatment or disposition:  None    Shared Decision Making completed with patient regarding risks and benefits of admission versus discharge.  Patient decides to be discharged home.    Code Status Discussion:  Not discussed    \"ED Course\" Notes From Epic Narrator:       CRITICAL CARE:   N/A    PROCEDURES:  Procedures      DATA FOR LAB AND RADIOLOGY TESTS ORDERED BELOW ARE REVIEWED BY THE ED CLINICIAN:    RADIOLOGY: All x-rays, CT, MRI, and formal ultrasound images (except ED bedside ultrasound) are read by the radiologist, see reports below, unless otherwise noted in MDM or here.  Reports below are reviewed by myself.  XR CERVICAL SPINE (2-3 VIEWS)   Final Result   Normal alignment with mild degenerative changes.         XR THORACIC SPINE (3 VIEWS)   Final Result   Normal alignment with mild degenerative changes.             LABS: Lab orders shown below, the results are reviewed by myself, and all abnormals are listed below.  Labs Reviewed - No data to display    Vitals Reviewed:    Vitals:    10/02/24 0556 10/02/24 0557   BP:  127/83   Pulse: 69    Resp: 14    Temp: 98.1 °F (36.7 °C)    TempSrc: Oral    SpO2: 96%    Weight: 113.4 kg (250 lb)    Height: 1.753 m (5' 
Use    Vaping status: Some Days   Substance Use Topics    Alcohol use: Yes     Comment: rarely    Drug use: No     PHYSICAL EXAM     INITIAL VITALS: /83   Pulse 69   Temp 98.1 °F (36.7 °C) (Oral)   Resp 14   Ht 1.753 m (5' 9\")   Wt 113.4 kg (250 lb)   SpO2 96%   BMI 36.92 kg/m²    Physical Exam  Constitutional:       Appearance: Normal appearance.   HENT:      Head: Normocephalic and atraumatic.   Eyes:      Extraocular Movements: Extraocular movements intact.      Pupils: Pupils are equal, round, and reactive to light.   Cardiovascular:      Rate and Rhythm: Normal rate and regular rhythm.   Pulmonary:      Effort: Pulmonary effort is normal.      Breath sounds: Normal breath sounds.   Abdominal:      General: Abdomen is flat.      Palpations: Abdomen is soft.      Tenderness: There is no abdominal tenderness.   Neurological:      Mental Status: He is alert.      Comments: Patient has normal sensation in the upper extremities bilaterally.  He has 5 out of 5 strength with , flexion extension at the wrist flexion extension at the elbow abduction abduction of the shoulder.         MEDICAL DECISION MAKING / ED COURSE:   Summary of Patient Presentation:      59-year-old male, bilateral arm tingling and pain, likely related to cervical radiculopathy, plan is x-ray, pain control and outpatient follow-up.    CRITICAL CARE:       PROCEDURES:  Procedures         DATA FOR LAB AND RADIOLOGY TESTS ORDERED BELOW ARE REVIEWED BY THE ED CLINICIAN:    RADIOLOGY: All x-rays, CT, MRI, and formal ultrasound images (except ED bedside ultrasound) are read by the radiologist, see reports below, unless otherwise noted in MDM or here.  Reports below are reviewed by myself.  XR CERVICAL SPINE (2-3 VIEWS)   Final Result   Normal alignment with mild degenerative changes.         XR THORACIC SPINE (3 VIEWS)   Final Result   Normal alignment with mild degenerative changes.             LABS: Lab orders shown below, the results

## 2024-10-02 NOTE — DISCHARGE INSTRUCTIONS
Call Central Scheduling Department at [phone] to schedule your EMG.    Follow this medication plan for 3 days to control your pain: (Warning - be mindful of over-the-counter medications that contain Tylenol. Maximum dose of acetaminophen is 4000 mg from all sources in 24 hours).    9am Tylenol 1000 mg  3pm Tylenol 1000 mg    Return to this emergency room immediately if your symptoms persist, worsen or if new ones form.    Make sure you follow-up with your primary care doctor within the next 1-2 business days.

## 2024-11-04 ENCOUNTER — HOSPITAL ENCOUNTER (OUTPATIENT)
Age: 59
Discharge: HOME | End: 2024-11-04
Payer: COMMERCIAL

## 2024-11-04 DIAGNOSIS — N18.32 STAGE 3B CHRONIC KIDNEY DISEASE (HCC): ICD-10-CM

## 2024-11-04 LAB
ANION GAP SERPL CALCULATED.3IONS-SCNC: 13 MMOL/L (ref 9–16)
BUN SERPL-MCNC: 21 MG/DL (ref 8–23)
CALCIUM SERPL-MCNC: 9.6 MG/DL (ref 8.6–10.4)
CHLORIDE SERPL-SCNC: 105 MMOL/L (ref 98–107)
CO2 SERPL-SCNC: 22 MMOL/L (ref 20–31)
CREAT SERPL-MCNC: 2 MG/DL (ref 0.7–1.2)
GFR, ESTIMATED: 37 ML/MIN/1.73M2
GLUCOSE SERPL-MCNC: 99 MG/DL (ref 74–99)
POTASSIUM SERPL-SCNC: 4 MMOL/L (ref 3.7–5.3)
PSA SERPL-MCNC: <0.03 NG/ML (ref 0–4)
SODIUM SERPL-SCNC: 140 MMOL/L (ref 136–145)

## 2024-11-04 PROCEDURE — 36415 COLL VENOUS BLD VENIPUNCTURE: CPT

## 2024-11-04 PROCEDURE — 84153 ASSAY OF PSA TOTAL: CPT

## 2024-11-04 PROCEDURE — 80048 BASIC METABOLIC PNL TOTAL CA: CPT

## 2025-04-12 ENCOUNTER — HOSPITAL ENCOUNTER (OUTPATIENT)
Dept: MRI IMAGING | Age: 60
Discharge: HOME | End: 2025-04-14
Payer: COMMERCIAL

## 2025-04-12 DIAGNOSIS — M54.50 LUMBAR PAIN: ICD-10-CM

## 2025-04-12 PROCEDURE — 72148 MRI LUMBAR SPINE W/O DYE: CPT

## 2025-05-14 ENCOUNTER — HOSPITAL ENCOUNTER (OUTPATIENT)
Age: 60
Discharge: HOME | End: 2025-05-14
Payer: COMMERCIAL

## 2025-05-14 DIAGNOSIS — N18.32 STAGE 3B CHRONIC KIDNEY DISEASE (HCC): ICD-10-CM

## 2025-05-14 DIAGNOSIS — I10 PRIMARY HYPERTENSION: ICD-10-CM

## 2025-05-14 LAB
25(OH)D3 SERPL-MCNC: 16.8 NG/ML (ref 30–100)
ANION GAP SERPL CALCULATED.3IONS-SCNC: 12 MMOL/L (ref 9–16)
BUN SERPL-MCNC: 22 MG/DL (ref 8–23)
CALCIUM SERPL-MCNC: 9.7 MG/DL (ref 8.6–10.4)
CHLORIDE SERPL-SCNC: 105 MMOL/L (ref 98–107)
CO2 SERPL-SCNC: 21 MMOL/L (ref 20–31)
CREAT SERPL-MCNC: 2 MG/DL (ref 0.7–1.2)
CREAT UR-MCNC: 199 MG/DL (ref 39–259)
ERYTHROCYTE [DISTWIDTH] IN BLOOD BY AUTOMATED COUNT: 13.2 % (ref 11.8–14.4)
GFR, ESTIMATED: 37 ML/MIN/1.73M2
GLUCOSE SERPL-MCNC: 143 MG/DL (ref 74–99)
HCT VFR BLD AUTO: 34.1 % (ref 40.7–50.3)
HGB BLD-MCNC: 11.5 G/DL (ref 13–17)
MCH RBC QN AUTO: 31.9 PG (ref 25.2–33.5)
MCHC RBC AUTO-ENTMCNC: 33.7 G/DL (ref 28.4–34.8)
MCV RBC AUTO: 94.7 FL (ref 82.6–102.9)
NRBC BLD-RTO: 0 PER 100 WBC
PLATELET # BLD AUTO: 229 K/UL (ref 138–453)
PMV BLD AUTO: 11.5 FL (ref 8.1–13.5)
POTASSIUM SERPL-SCNC: 4.1 MMOL/L (ref 3.7–5.3)
PTH-INTACT SERPL-MCNC: 35 PG/ML (ref 17.9–58.6)
RBC # BLD AUTO: 3.6 M/UL (ref 4.21–5.77)
SODIUM SERPL-SCNC: 138 MMOL/L (ref 136–145)
TOTAL PROTEIN, URINE: 75 MG/DL
URINE TOTAL PROTEIN CREATININE RATIO: 0.38 (ref 0–0.2)
WBC OTHER # BLD: 8 K/UL (ref 3.5–11.3)

## 2025-05-14 PROCEDURE — 85027 COMPLETE CBC AUTOMATED: CPT

## 2025-05-14 PROCEDURE — 80048 BASIC METABOLIC PNL TOTAL CA: CPT

## 2025-05-14 PROCEDURE — 82306 VITAMIN D 25 HYDROXY: CPT

## 2025-05-14 PROCEDURE — 83970 ASSAY OF PARATHORMONE: CPT

## 2025-05-14 PROCEDURE — 82570 ASSAY OF URINE CREATININE: CPT

## 2025-05-14 PROCEDURE — 36415 COLL VENOUS BLD VENIPUNCTURE: CPT

## 2025-05-14 PROCEDURE — 84156 ASSAY OF PROTEIN URINE: CPT

## 2025-06-06 ENCOUNTER — HOSPITAL ENCOUNTER (OUTPATIENT)
Dept: GENERAL RADIOLOGY | Age: 60
Discharge: HOME | End: 2025-06-08
Payer: COMMERCIAL

## 2025-06-06 DIAGNOSIS — M25.561 CHRONIC PAIN OF BOTH KNEES: ICD-10-CM

## 2025-06-06 DIAGNOSIS — M25.562 CHRONIC PAIN OF BOTH KNEES: ICD-10-CM

## 2025-06-06 DIAGNOSIS — G89.29 CHRONIC PAIN OF BOTH KNEES: ICD-10-CM

## 2025-06-06 PROCEDURE — 73565 X-RAY EXAM OF KNEES: CPT

## 2025-08-14 ENCOUNTER — HOSPITAL ENCOUNTER (EMERGENCY)
Age: 60
Discharge: HOME | End: 2025-08-14
Attending: EMERGENCY MEDICINE
Payer: OTHER GOVERNMENT

## 2025-08-14 VITALS
OXYGEN SATURATION: 100 % | TEMPERATURE: 100 F | RESPIRATION RATE: 18 BRPM | WEIGHT: 270 LBS | SYSTOLIC BLOOD PRESSURE: 99 MMHG | HEART RATE: 86 BPM | HEIGHT: 69 IN | DIASTOLIC BLOOD PRESSURE: 67 MMHG | BODY MASS INDEX: 39.99 KG/M2

## 2025-08-14 DIAGNOSIS — R05.9 COUGH, UNSPECIFIED TYPE: ICD-10-CM

## 2025-08-14 DIAGNOSIS — J02.9 ACUTE PHARYNGITIS, UNSPECIFIED ETIOLOGY: Primary | ICD-10-CM

## 2025-08-14 LAB
FLUAV RNA RESP QL NAA+PROBE: NOT DETECTED
FLUBV RNA RESP QL NAA+PROBE: NOT DETECTED
SARS-COV-2 RNA RESP QL NAA+PROBE: NOT DETECTED
SOURCE: NORMAL
SPECIMEN DESCRIPTION: NORMAL
SPECIMEN SOURCE: NORMAL
STREP A, MOLECULAR: NEGATIVE

## 2025-08-14 PROCEDURE — 87636 SARSCOV2 & INF A&B AMP PRB: CPT

## 2025-08-14 PROCEDURE — 87651 STREP A DNA AMP PROBE: CPT

## 2025-08-14 PROCEDURE — 96372 THER/PROPH/DIAG INJ SC/IM: CPT

## 2025-08-14 PROCEDURE — 6370000000 HC RX 637 (ALT 250 FOR IP): Performed by: PHYSICIAN ASSISTANT

## 2025-08-14 PROCEDURE — 99284 EMERGENCY DEPT VISIT MOD MDM: CPT

## 2025-08-14 PROCEDURE — 6360000002 HC RX W HCPCS: Performed by: PHYSICIAN ASSISTANT

## 2025-08-14 RX ORDER — FLUTICASONE PROPIONATE 50 MCG
2 SPRAY, SUSPENSION (ML) NASAL DAILY
Qty: 16 G | Refills: 0 | Status: SHIPPED | OUTPATIENT
Start: 2025-08-14

## 2025-08-14 RX ORDER — ALBUTEROL SULFATE 90 UG/1
2 INHALANT RESPIRATORY (INHALATION) EVERY 4 HOURS PRN
Qty: 8.5 G | Refills: 0 | Status: SHIPPED | OUTPATIENT
Start: 2025-08-14

## 2025-08-14 RX ORDER — BENZONATATE 100 MG/1
100 CAPSULE ORAL 3 TIMES DAILY PRN
Qty: 30 CAPSULE | Refills: 0 | Status: SHIPPED | OUTPATIENT
Start: 2025-08-14 | End: 2025-08-21

## 2025-08-14 RX ORDER — ACETAMINOPHEN 500 MG
1000 TABLET ORAL ONCE
Status: COMPLETED | OUTPATIENT
Start: 2025-08-14 | End: 2025-08-14

## 2025-08-14 RX ORDER — AZITHROMYCIN 250 MG/1
TABLET, FILM COATED ORAL
Qty: 1 PACKET | Refills: 0 | Status: SHIPPED | OUTPATIENT
Start: 2025-08-14 | End: 2025-08-24

## 2025-08-14 RX ORDER — PREDNISONE 20 MG/1
20 TABLET ORAL 2 TIMES DAILY
Qty: 10 TABLET | Refills: 0 | Status: SHIPPED | OUTPATIENT
Start: 2025-08-14 | End: 2025-08-19

## 2025-08-14 RX ORDER — DEXAMETHASONE SODIUM PHOSPHATE 10 MG/ML
10 INJECTION, SOLUTION INTRAMUSCULAR; INTRAVENOUS ONCE
Status: COMPLETED | OUTPATIENT
Start: 2025-08-14 | End: 2025-08-14

## 2025-08-14 RX ADMIN — DEXAMETHASONE SODIUM PHOSPHATE 10 MG: 10 INJECTION INTRAMUSCULAR; INTRAVENOUS at 13:14

## 2025-08-14 RX ADMIN — ACETAMINOPHEN 1000 MG: 500 TABLET ORAL at 13:14

## 2025-08-14 ASSESSMENT — PAIN - FUNCTIONAL ASSESSMENT
PAIN_FUNCTIONAL_ASSESSMENT: 0-10
PAIN_FUNCTIONAL_ASSESSMENT: 0-10

## 2025-08-14 ASSESSMENT — PAIN SCALES - GENERAL
PAINLEVEL_OUTOF10: 6
PAINLEVEL_OUTOF10: 5

## 2025-08-14 ASSESSMENT — PAIN DESCRIPTION - LOCATION: LOCATION: HEAD;THROAT
